# Patient Record
Sex: MALE | Race: WHITE | NOT HISPANIC OR LATINO | URBAN - METROPOLITAN AREA
[De-identification: names, ages, dates, MRNs, and addresses within clinical notes are randomized per-mention and may not be internally consistent; named-entity substitution may affect disease eponyms.]

---

## 2017-02-02 ENCOUNTER — OUTPATIENT (OUTPATIENT)
Dept: OUTPATIENT SERVICES | Facility: HOSPITAL | Age: 37
LOS: 1 days | End: 2017-02-02

## 2017-02-03 ENCOUNTER — MEDICATION RENEWAL (OUTPATIENT)
Age: 37
End: 2017-02-03

## 2017-03-10 ENCOUNTER — LABORATORY RESULT (OUTPATIENT)
Age: 37
End: 2017-03-10

## 2017-03-15 ENCOUNTER — APPOINTMENT (OUTPATIENT)
Age: 37
End: 2017-03-15

## 2017-03-15 VITALS
HEIGHT: 74 IN | DIASTOLIC BLOOD PRESSURE: 85 MMHG | HEART RATE: 60 BPM | SYSTOLIC BLOOD PRESSURE: 125 MMHG | RESPIRATION RATE: 17 BRPM | BODY MASS INDEX: 35.29 KG/M2 | WEIGHT: 275 LBS | TEMPERATURE: 98 F

## 2017-07-19 ENCOUNTER — RX RENEWAL (OUTPATIENT)
Age: 37
End: 2017-07-19

## 2017-09-01 ENCOUNTER — RX RENEWAL (OUTPATIENT)
Age: 37
End: 2017-09-01

## 2017-09-08 LAB
ALBUMIN SERPL ELPH-MCNC: 4.2 G/DL
ALP BLD-CCNC: 155 U/L
ALT SERPL-CCNC: 35 U/L
ANION GAP SERPL CALC-SCNC: 14 MMOL/L
AST SERPL-CCNC: 35 U/L
BASOPHILS # BLD AUTO: 0.03 K/UL
BASOPHILS NFR BLD AUTO: 0.5 %
BILIRUB SERPL-MCNC: 0.7 MG/DL
BUN SERPL-MCNC: 10 MG/DL
CALCIUM SERPL-MCNC: 9.7 MG/DL
CHLORIDE SERPL-SCNC: 103 MMOL/L
CO2 SERPL-SCNC: 25 MMOL/L
CREAT SERPL-MCNC: 1.06 MG/DL
EOSINOPHIL # BLD AUTO: 0.1 K/UL
EOSINOPHIL NFR BLD AUTO: 1.8 %
HCT VFR BLD CALC: 43.9 %
HGB BLD-MCNC: 14.7 G/DL
IMM GRANULOCYTES NFR BLD AUTO: 0 %
LYMPHOCYTES # BLD AUTO: 1.58 K/UL
LYMPHOCYTES NFR BLD AUTO: 27.9 %
MAN DIFF?: NORMAL
MCHC RBC-ENTMCNC: 31.3 PG
MCHC RBC-ENTMCNC: 33.5 GM/DL
MCV RBC AUTO: 93.6 FL
MONOCYTES # BLD AUTO: 0.59 K/UL
MONOCYTES NFR BLD AUTO: 10.4 %
NEUTROPHILS # BLD AUTO: 3.37 K/UL
NEUTROPHILS NFR BLD AUTO: 59.4 %
PLATELET # BLD AUTO: 218 K/UL
POTASSIUM SERPL-SCNC: 4.8 MMOL/L
PROT SERPL-MCNC: 7.4 G/DL
RBC # BLD: 4.69 M/UL
RBC # FLD: 12.7 %
SODIUM SERPL-SCNC: 142 MMOL/L
WBC # FLD AUTO: 5.67 K/UL

## 2017-09-13 ENCOUNTER — APPOINTMENT (OUTPATIENT)
Age: 37
End: 2017-09-13
Payer: COMMERCIAL

## 2017-09-13 VITALS
SYSTOLIC BLOOD PRESSURE: 124 MMHG | HEIGHT: 74 IN | DIASTOLIC BLOOD PRESSURE: 85 MMHG | TEMPERATURE: 98.5 F | BODY MASS INDEX: 36.32 KG/M2 | HEART RATE: 61 BPM | RESPIRATION RATE: 17 BRPM | WEIGHT: 283 LBS

## 2017-09-13 PROCEDURE — 99213 OFFICE O/P EST LOW 20 MIN: CPT | Mod: 25

## 2017-09-13 PROCEDURE — 91200 LIVER ELASTOGRAPHY: CPT

## 2017-09-13 PROCEDURE — ZZZZZ: CPT

## 2017-10-04 ENCOUNTER — RX RENEWAL (OUTPATIENT)
Age: 37
End: 2017-10-04

## 2018-03-08 ENCOUNTER — LABORATORY RESULT (OUTPATIENT)
Age: 38
End: 2018-03-08

## 2018-03-14 ENCOUNTER — APPOINTMENT (OUTPATIENT)
Age: 38
End: 2018-03-14
Payer: COMMERCIAL

## 2018-03-14 VITALS
RESPIRATION RATE: 17 BRPM | WEIGHT: 294 LBS | SYSTOLIC BLOOD PRESSURE: 137 MMHG | TEMPERATURE: 98.5 F | HEART RATE: 75 BPM | DIASTOLIC BLOOD PRESSURE: 88 MMHG | HEIGHT: 74 IN | BODY MASS INDEX: 37.73 KG/M2

## 2018-03-14 PROCEDURE — 99213 OFFICE O/P EST LOW 20 MIN: CPT

## 2018-03-28 ENCOUNTER — OTHER (OUTPATIENT)
Age: 38
End: 2018-03-28

## 2018-03-28 ENCOUNTER — TRANSCRIPTION ENCOUNTER (OUTPATIENT)
Age: 38
End: 2018-03-28

## 2018-03-28 ENCOUNTER — MEDICATION RENEWAL (OUTPATIENT)
Age: 38
End: 2018-03-28

## 2018-04-27 ENCOUNTER — TRANSCRIPTION ENCOUNTER (OUTPATIENT)
Age: 38
End: 2018-04-27

## 2018-07-23 LAB
ALBUMIN SERPL ELPH-MCNC: 4.5 G/DL
ALP BLD-CCNC: 353 U/L
ALT SERPL-CCNC: 86 U/L
ANION GAP SERPL CALC-SCNC: 12 MMOL/L
AST SERPL-CCNC: 79 U/L
BASOPHILS # BLD AUTO: 0.03 K/UL
BASOPHILS NFR BLD AUTO: 0.6 %
BILIRUB SERPL-MCNC: 0.8 MG/DL
BUN SERPL-MCNC: 16 MG/DL
CALCIUM SERPL-MCNC: 9.7 MG/DL
CHLORIDE SERPL-SCNC: 102 MMOL/L
CO2 SERPL-SCNC: 26 MMOL/L
CREAT SERPL-MCNC: 1.17 MG/DL
EOSINOPHIL # BLD AUTO: 0.1 K/UL
EOSINOPHIL NFR BLD AUTO: 1.9 %
HCT VFR BLD CALC: 44 %
HGB BLD-MCNC: 15 G/DL
IMM GRANULOCYTES NFR BLD AUTO: 0.2 %
LYMPHOCYTES # BLD AUTO: 1.37 K/UL
LYMPHOCYTES NFR BLD AUTO: 26.7 %
MAN DIFF?: NORMAL
MCHC RBC-ENTMCNC: 33 PG
MCHC RBC-ENTMCNC: 34.1 GM/DL
MCV RBC AUTO: 96.7 FL
MONOCYTES # BLD AUTO: 0.4 K/UL
MONOCYTES NFR BLD AUTO: 7.8 %
NEUTROPHILS # BLD AUTO: 3.23 K/UL
NEUTROPHILS NFR BLD AUTO: 62.8 %
PLATELET # BLD AUTO: 251 K/UL
POTASSIUM SERPL-SCNC: 4.6 MMOL/L
PROT SERPL-MCNC: 7.8 G/DL
RBC # BLD: 4.55 M/UL
RBC # FLD: 13.3 %
SODIUM SERPL-SCNC: 141 MMOL/L
WBC # FLD AUTO: 5.14 K/UL

## 2018-07-26 ENCOUNTER — APPOINTMENT (OUTPATIENT)
Dept: HEPATOLOGY | Facility: CLINIC | Age: 38
End: 2018-07-26
Payer: COMMERCIAL

## 2018-07-26 VITALS
HEIGHT: 74 IN | SYSTOLIC BLOOD PRESSURE: 128 MMHG | BODY MASS INDEX: 35.42 KG/M2 | RESPIRATION RATE: 14 BRPM | DIASTOLIC BLOOD PRESSURE: 91 MMHG | HEART RATE: 137 BPM | WEIGHT: 276 LBS | OXYGEN SATURATION: 95 % | TEMPERATURE: 97.7 F

## 2018-07-26 LAB
AFP-TM SERPL-MCNC: 3.4 NG/ML
INR PPP: 1.02 RATIO
PT BLD: 11.5 SEC

## 2018-07-26 PROCEDURE — 99213 OFFICE O/P EST LOW 20 MIN: CPT

## 2018-08-01 LAB
ALBUMIN SERPL ELPH-MCNC: 4.4 G/DL
ALP BLD-CCNC: 386 U/L
ALT SERPL-CCNC: 97 U/L
ANA PAT FLD IF-IMP: ABNORMAL
ANA PATTERN: ABNORMAL
ANA SER IF-ACNC: ABNORMAL
ANA TITER: ABNORMAL
ANION GAP SERPL CALC-SCNC: 14 MMOL/L
AST SERPL-CCNC: 93 U/L
BILIRUB SERPL-MCNC: 0.7 MG/DL
BUN SERPL-MCNC: 14 MG/DL
CALCIUM SERPL-MCNC: 9.8 MG/DL
CHLORIDE SERPL-SCNC: 100 MMOL/L
CO2 SERPL-SCNC: 25 MMOL/L
CREAT SERPL-MCNC: 0.93 MG/DL
EBV DNA SERPL NAA+PROBE-ACNC: NOT DETECTED IU/ML
EBV EA AB SER IA-ACNC: >150 U/ML
EBV EA AB TITR SER IF: POSITIVE
EBV EA IGG SER QL IA: 454 U/ML
EBV EA IGG SER-ACNC: POSITIVE
EBV EA IGM SER IA-ACNC: NEGATIVE
EBV PATRN SPEC IB-IMP: NORMAL
EBV VCA IGG SER IA-ACNC: >750 U/ML
EBV VCA IGM SER QL IA: <10 U/ML
EBVPCR LOG: NOT DETECTED LOGIU/ML
EPSTEIN-BARR VIRUS CAPSID ANTIGEN IGG: POSITIVE
GGT SERPL-CCNC: 450 U/L
HBV CORE IGG+IGM SER QL: NONREACTIVE
HBV SURFACE AB SER QL: REACTIVE
HBV SURFACE AB SERPL IA-ACNC: 121.2 MIU/ML
HBV SURFACE AG SER QL: NONREACTIVE
HEPATITIS A IGG ANTIBODY: NONREACTIVE
HEPATITIS E IGM ABY: NORMAL
HEV AB SER QL: NEGATIVE
IGA SER QL IEP: 221 MG/DL
IGG SER QL IEP: 1969 MG/DL
IGM SER QL IEP: 117 MG/DL
MITOCHONDRIA AB SER IF-ACNC: NORMAL
POTASSIUM SERPL-SCNC: 4.7 MMOL/L
PROT SERPL-MCNC: 8.7 G/DL
SMOOTH MUSCLE AB SER QL IF: ABNORMAL
SODIUM SERPL-SCNC: 139 MMOL/L

## 2018-08-06 ENCOUNTER — MEDICATION RENEWAL (OUTPATIENT)
Age: 38
End: 2018-08-06

## 2018-08-06 ENCOUNTER — TRANSCRIPTION ENCOUNTER (OUTPATIENT)
Age: 38
End: 2018-08-06

## 2018-08-12 ENCOUNTER — FORM ENCOUNTER (OUTPATIENT)
Age: 38
End: 2018-08-12

## 2018-08-13 ENCOUNTER — OUTPATIENT (OUTPATIENT)
Dept: OUTPATIENT SERVICES | Facility: HOSPITAL | Age: 38
LOS: 1 days | End: 2018-08-13
Payer: COMMERCIAL

## 2018-08-13 ENCOUNTER — APPOINTMENT (OUTPATIENT)
Dept: MRI IMAGING | Facility: CLINIC | Age: 38
End: 2018-08-13
Payer: COMMERCIAL

## 2018-08-13 DIAGNOSIS — Z00.8 ENCOUNTER FOR OTHER GENERAL EXAMINATION: ICD-10-CM

## 2018-08-13 PROCEDURE — 74183 MRI ABD W/O CNTR FLWD CNTR: CPT | Mod: 26

## 2018-08-13 PROCEDURE — A9585: CPT

## 2018-08-13 PROCEDURE — 74183 MRI ABD W/O CNTR FLWD CNTR: CPT

## 2018-08-23 ENCOUNTER — LABORATORY RESULT (OUTPATIENT)
Age: 38
End: 2018-08-23

## 2018-08-30 ENCOUNTER — TRANSCRIPTION ENCOUNTER (OUTPATIENT)
Age: 38
End: 2018-08-30

## 2018-09-03 ENCOUNTER — OTHER (OUTPATIENT)
Age: 38
End: 2018-09-03

## 2018-09-03 ENCOUNTER — MOBILE ON CALL (OUTPATIENT)
Age: 38
End: 2018-09-03

## 2018-09-12 ENCOUNTER — RX RENEWAL (OUTPATIENT)
Age: 38
End: 2018-09-12

## 2018-09-24 ENCOUNTER — TRANSCRIPTION ENCOUNTER (OUTPATIENT)
Age: 38
End: 2018-09-24

## 2018-10-01 ENCOUNTER — RX RENEWAL (OUTPATIENT)
Age: 38
End: 2018-10-01

## 2018-10-12 ENCOUNTER — RX RENEWAL (OUTPATIENT)
Age: 38
End: 2018-10-12

## 2018-11-20 ENCOUNTER — TRANSCRIPTION ENCOUNTER (OUTPATIENT)
Age: 38
End: 2018-11-20

## 2018-11-23 ENCOUNTER — APPOINTMENT (OUTPATIENT)
Dept: ULTRASOUND IMAGING | Facility: CLINIC | Age: 38
End: 2018-11-23

## 2018-11-28 ENCOUNTER — TRANSCRIPTION ENCOUNTER (OUTPATIENT)
Age: 38
End: 2018-11-28

## 2018-11-28 ENCOUNTER — OTHER (OUTPATIENT)
Age: 38
End: 2018-11-28

## 2018-11-29 ENCOUNTER — TRANSCRIPTION ENCOUNTER (OUTPATIENT)
Age: 38
End: 2018-11-29

## 2019-01-11 ENCOUNTER — OTHER (OUTPATIENT)
Age: 39
End: 2019-01-11

## 2019-01-11 ENCOUNTER — TRANSCRIPTION ENCOUNTER (OUTPATIENT)
Age: 39
End: 2019-01-11

## 2019-02-07 ENCOUNTER — LABORATORY RESULT (OUTPATIENT)
Age: 39
End: 2019-02-07

## 2019-02-08 ENCOUNTER — APPOINTMENT (OUTPATIENT)
Dept: HEPATOLOGY | Facility: CLINIC | Age: 39
End: 2019-02-08
Payer: COMMERCIAL

## 2019-02-08 VITALS
TEMPERATURE: 98.1 F | WEIGHT: 282 LBS | BODY MASS INDEX: 36.19 KG/M2 | HEIGHT: 74 IN | SYSTOLIC BLOOD PRESSURE: 120 MMHG | DIASTOLIC BLOOD PRESSURE: 84 MMHG | HEART RATE: 73 BPM | RESPIRATION RATE: 16 BRPM

## 2019-02-08 LAB
ALBUMIN SERPL ELPH-MCNC: 4.2 G/DL
ALP BLD-CCNC: 255 U/L
ALT SERPL-CCNC: 264 U/L
ANION GAP SERPL CALC-SCNC: 11 MMOL/L
AST SERPL-CCNC: 159 U/L
BASOPHILS # BLD AUTO: 0.02 K/UL
BASOPHILS NFR BLD AUTO: 0.4 %
BILIRUB SERPL-MCNC: 1.2 MG/DL
BUN SERPL-MCNC: 13 MG/DL
CALCIUM SERPL-MCNC: 9.4 MG/DL
CARDIOLIPIN AB SER IA-ACNC: NEGATIVE
CHLORIDE SERPL-SCNC: 105 MMOL/L
CO2 SERPL-SCNC: 25 MMOL/L
CREAT SERPL-MCNC: 1.02 MG/DL
EOSINOPHIL # BLD AUTO: 0.11 K/UL
EOSINOPHIL NFR BLD AUTO: 2.4 %
HCT VFR BLD CALC: 44.7 %
HGB BLD-MCNC: 14.6 G/DL
IGA SER QL IEP: 226 MG/DL
IGG SER QL IEP: 2241 MG/DL
IGM SER QL IEP: 129 MG/DL
IMM GRANULOCYTES NFR BLD AUTO: 0.2 %
LYMPHOCYTES # BLD AUTO: 1.37 K/UL
LYMPHOCYTES NFR BLD AUTO: 29.7 %
MAN DIFF?: NORMAL
MCHC RBC-ENTMCNC: 32.1 PG
MCHC RBC-ENTMCNC: 32.7 GM/DL
MCV RBC AUTO: 98.2 FL
MONOCYTES # BLD AUTO: 0.52 K/UL
MONOCYTES NFR BLD AUTO: 11.3 %
MPO AB + PR3 PNL SER: NORMAL
NEUTROPHILS # BLD AUTO: 2.59 K/UL
NEUTROPHILS NFR BLD AUTO: 56 %
PLATELET # BLD AUTO: 213 K/UL
POTASSIUM SERPL-SCNC: 4.6 MMOL/L
PROT SERPL-MCNC: 7.9 G/DL
RBC # BLD: 4.55 M/UL
RBC # FLD: 13.2 %
SODIUM SERPL-SCNC: 141 MMOL/L
WBC # FLD AUTO: 4.62 K/UL

## 2019-02-08 PROCEDURE — 99213 OFFICE O/P EST LOW 20 MIN: CPT

## 2019-02-08 RX ORDER — METOPROLOL SUCCINATE 50 MG/1
50 TABLET, EXTENDED RELEASE ORAL
Qty: 90 | Refills: 0 | Status: DISCONTINUED | COMMUNITY
Start: 2018-07-16 | End: 2019-02-08

## 2019-02-08 NOTE — LETTER CLOSING
[Edgar Manzano MD, FACP, FACG, AGAF] : Egdar Manzano MD, FACP, FACG, AGAF [Chief, Division of Hepatology] : Chief, Division of Hepatology [[facility]] : [facility] [Professor of Medicine] : Professor of Medicine [[facility]] : [facility]

## 2019-02-08 NOTE — CONSULT LETTER
[Dear  ___] : Dear  [unfilled], [Courtesy Letter:] : I had the pleasure of seeing your patient, [unfilled], in my office today. [Please see my note below.] : Please see my note below. [Consult Closing:] : Thank you very much for allowing me to participate in the care of this patient.  If you have any questions, please do not hesitate to contact me. [Sincerely,] : Sincerely, [Ying Coburn, N.P] : Ying Coburn, N.P [FreeTextEntry2] : Dr Pete Yarbrough

## 2019-02-08 NOTE — PHYSICAL EXAM
[Liver Size (___ Cm)] : Liver size [unfilled] cm [Non-Tender] : non-tender [General Appearance - Alert] : alert [General Appearance - In No Acute Distress] : in no acute distress [Sclera] : the sclera and conjunctiva were normal [Neck Appearance] : the appearance of the neck was normal [Neck Cervical Mass (___cm)] : no neck mass was observed [Jugular Venous Distention Increased] : there was no jugular-venous distention [Thyroid Diffuse Enlargement] : the thyroid was not enlarged [Thyroid Nodule] : there were no palpable thyroid nodules [Auscultation Breath Sounds / Voice Sounds] : lungs were clear to auscultation bilaterally [Heart Rate And Rhythm] : heart rate was normal and rhythm regular [Heart Sounds] : normal S1 and S2 [Heart Sounds Gallop] : no gallops [Murmurs] : no murmurs [Heart Sounds Pericardial Friction Rub] : no pericardial rub [Edema] : there was no peripheral edema [Bowel Sounds] : normal bowel sounds [Abdomen Soft] : soft [Abdomen Tenderness] : non-tender [] : no hepato-splenomegaly [Abdomen Mass (___ Cm)] : no abdominal mass palpated [Oriented To Time, Place, And Person] : oriented to person, place, and time [Scleral Icterus] : No Scleral Icterus [Hepatojugular Reflux] : patient did not have a sustained hepatojugular reflux [Spider Angioma] : No spider angioma(s) were observed [Abdominal Bruit] : no abdominal bruit [Splenomegaly] : no splenomegaly [Asterixis] : no asterixis observed [Jaundice] : No jaundice [Palmar Erythema] : no Palmar Erythema

## 2019-02-08 NOTE — HISTORY OF PRESENT ILLNESS
[de-identified] : Melchor  comes in today for followup. He has abnormal liver enzymes likely secondary to autoimmune liver disease. He feels well without complaints. He has been dieting but not been successful in losing weight.   He is currently taking prednisone 2 mg a day (since Jan 2016),  Rupert 2 g a day and Imuran 75 mg a day. Liver biopsy from February 4, 2014 shows chronic hepatitis and portal fibrosis no evidence of bridging fibrosis or cirrhosis. It was nondiagnostic for autoimmune disease and there was some biliary ductal abnormalities. He reports being told that he have fatty liver on imaging in the past. His blood tests on 2/7/19 showed a rise in liver markers, ,  and , IGG 2241. He has not started new supplements/herbal products or medications. No alcohol use. No recent illness. His complete liver workup from July 2018  was negative for viral hepatitis and PBC, ASMA was 1:40, CHALRIE 1:160 and IGG 1881.  MRI from Aug 2018 did not showed evidence of PSC. \par \par Fibroscan test done 4/5/16 showed F4. \par Fibroscan test performed 9/13/17 showed F0, steatosis S0.\par MRE from 8/13/18 showed no fibrosis, no significant steatosis. \par \par Abdo MRI 8/13/18 showed no hepatic masses, bile ducts without choledocholithiasis or stricture, elastrography showed no fibrosis. \par \par US from 7/21/18 no hepatic lesion, common bile duct and pancreas not well visualized due to excessive overlying bowel gas.\par \par Abdominal ultrasound 3/8/17 showed no hepatic lesions. \par \par Abdominal sonogram from December 2, 2015 shows diffuse fatty infiltration and splenomegaly.\par \par

## 2019-02-08 NOTE — ASSESSMENT
[FreeTextEntry1] : 38-year-old gentleman with abnormal liver enzymes who is being treated for autoimmune liver disease. 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 He also has fatty liver based on imaging.  His blood tests on 2/7/19 showed a rise in liver markers, ,  and , IGG 2241. He has not started new supplements/herbal products or medications. No alcohol use. No recent illness. I recommended increasing prednisone to 30 mg daily with a rapid taper reducing by 5 mg every 5 days with repeat lab. If no improvement with LFTs than he will need a liver biopsy. He is agreeable. He has asked me to leave him a message on his voicemail if he does not . \par \par  He will continue with diet and exercise.   He will continue with azathioprine and ursodiol same dosage. \par \par I have reviewed the treatment of fatty liver disease with the patient. I have explained that the best therapy is diet and exercise. I have reviewed and appropriate diet with the patient. I have recommended the avoidance of fatty foods and to follow a good healthy heart diet. I have explained that weight loss may lead to an improvement in the underlying liver disease state. \par \par I have reviewed the side effects of prednisone with the patient. I have reviewed the risks of long term treatment. I have explained the risks of developing acne, moon-shaped facies, abdominal striae, a dorsal hump, weight gain, diabetes, cataracts, hypertension, and ischemic necrosis of the hips. I have explained that there may be other side effects as well related to prednisone treatment. I had reviewed with the patient the side effects of azathioprine. I have reviewed the risks of nausea, vomiting, abdominal discomfort, hepatotoxicity, rash, leukocytopenia. I have also reviewed the theoretical risks of development of malignancy.\par \par I reviewed with the patient the side effects of azathioprine. I have reviewed the risks of nausea, vomiting, abdominal discomfort, hepatotoxicity, rash, leukocytopenia. I have also reviewed the theoretical risks of development of malignancy.\par \par

## 2019-02-11 LAB — IGG4 SER-MCNC: 94 MG/DL

## 2019-02-19 ENCOUNTER — TRANSCRIPTION ENCOUNTER (OUTPATIENT)
Age: 39
End: 2019-02-19

## 2019-02-19 ENCOUNTER — OTHER (OUTPATIENT)
Age: 39
End: 2019-02-19

## 2019-02-20 LAB
ALBUMIN SERPL ELPH-MCNC: 3.6 G/DL
ALP BLD-CCNC: 164 U/L
ALT SERPL-CCNC: 70 U/L
ANION GAP SERPL CALC-SCNC: 12 MMOL/L
AST SERPL-CCNC: 34 U/L
BILIRUB SERPL-MCNC: 0.6 MG/DL
BUN SERPL-MCNC: 16 MG/DL
CALCIUM SERPL-MCNC: 8.9 MG/DL
CHLORIDE SERPL-SCNC: 106 MMOL/L
CO2 SERPL-SCNC: 24 MMOL/L
CREAT SERPL-MCNC: 1.05 MG/DL
POTASSIUM SERPL-SCNC: 3.9 MMOL/L
PROT SERPL-MCNC: 7 G/DL
SODIUM SERPL-SCNC: 142 MMOL/L

## 2019-02-26 ENCOUNTER — MEDICATION RENEWAL (OUTPATIENT)
Age: 39
End: 2019-02-26

## 2019-03-02 ENCOUNTER — TRANSCRIPTION ENCOUNTER (OUTPATIENT)
Age: 39
End: 2019-03-02

## 2019-03-04 LAB
ALBUMIN SERPL ELPH-MCNC: 4 G/DL
ALP BLD-CCNC: 142 U/L
ALT SERPL-CCNC: 43 U/L
ANION GAP SERPL CALC-SCNC: 11 MMOL/L
AST SERPL-CCNC: 27 U/L
BILIRUB SERPL-MCNC: 0.7 MG/DL
BUN SERPL-MCNC: 18 MG/DL
CALCIUM SERPL-MCNC: 9.1 MG/DL
CHLORIDE SERPL-SCNC: 104 MMOL/L
CO2 SERPL-SCNC: 27 MMOL/L
CREAT SERPL-MCNC: 1.1 MG/DL
POTASSIUM SERPL-SCNC: 3.9 MMOL/L
PROT SERPL-MCNC: 7 G/DL
SODIUM SERPL-SCNC: 142 MMOL/L

## 2019-03-08 ENCOUNTER — TRANSCRIPTION ENCOUNTER (OUTPATIENT)
Age: 39
End: 2019-03-08

## 2019-03-11 ENCOUNTER — APPOINTMENT (OUTPATIENT)
Dept: HEPATOLOGY | Facility: CLINIC | Age: 39
End: 2019-03-11
Payer: COMMERCIAL

## 2019-03-11 VITALS
RESPIRATION RATE: 16 BRPM | TEMPERATURE: 98.2 F | BODY MASS INDEX: 38.12 KG/M2 | WEIGHT: 297 LBS | DIASTOLIC BLOOD PRESSURE: 84 MMHG | HEART RATE: 74 BPM | SYSTOLIC BLOOD PRESSURE: 124 MMHG | HEIGHT: 74 IN

## 2019-03-11 PROCEDURE — 99213 OFFICE O/P EST LOW 20 MIN: CPT

## 2019-03-12 LAB
ALBUMIN SERPL ELPH-MCNC: 4.2 G/DL
ALP BLD-CCNC: 165 U/L
ALT SERPL-CCNC: 24 U/L
ANION GAP SERPL CALC-SCNC: 10 MMOL/L
AST SERPL-CCNC: 13 U/L
BILIRUB SERPL-MCNC: 0.4 MG/DL
BUN SERPL-MCNC: 12 MG/DL
CALCIUM SERPL-MCNC: 9.1 MG/DL
CHLORIDE SERPL-SCNC: 107 MMOL/L
CO2 SERPL-SCNC: 22 MMOL/L
CREAT SERPL-MCNC: 0.95 MG/DL
DEPRECATED KAPPA LC FREE/LAMBDA SER: 1.32 RATIO
IGA SER QL IEP: 194 MG/DL
IGG SER QL IEP: 1334 MG/DL
IGM SER QL IEP: 110 MG/DL
KAPPA LC CSF-MCNC: 1.35 MG/DL
KAPPA LC SERPL-MCNC: 1.78 MG/DL
POTASSIUM SERPL-SCNC: 5.1 MMOL/L
PROT SERPL-MCNC: 7.5 G/DL
SODIUM SERPL-SCNC: 139 MMOL/L

## 2019-04-03 LAB
ALBUMIN SERPL ELPH-MCNC: 4.4 G/DL
ALP BLD-CCNC: 149 U/L
ALT SERPL-CCNC: 31 U/L
ANION GAP SERPL CALC-SCNC: 11 MMOL/L
AST SERPL-CCNC: 37 U/L
BILIRUB SERPL-MCNC: 0.5 MG/DL
BUN SERPL-MCNC: 15 MG/DL
CALCIUM SERPL-MCNC: 9.8 MG/DL
CHLORIDE SERPL-SCNC: 104 MMOL/L
CO2 SERPL-SCNC: 25 MMOL/L
CREAT SERPL-MCNC: 1.04 MG/DL
POTASSIUM SERPL-SCNC: 4.8 MMOL/L
PROT SERPL-MCNC: 7.3 G/DL
SODIUM SERPL-SCNC: 140 MMOL/L

## 2019-06-06 LAB
ALBUMIN SERPL ELPH-MCNC: 4.2 G/DL
ALP BLD-CCNC: 198 U/L
ALT SERPL-CCNC: 52 U/L
ANION GAP SERPL CALC-SCNC: 12 MMOL/L
AST SERPL-CCNC: 50 U/L
BASOPHILS # BLD AUTO: 0.03 K/UL
BASOPHILS NFR BLD AUTO: 0.7 %
BILIRUB SERPL-MCNC: 0.7 MG/DL
BUN SERPL-MCNC: 11 MG/DL
CALCIUM SERPL-MCNC: 9.3 MG/DL
CHLORIDE SERPL-SCNC: 106 MMOL/L
CO2 SERPL-SCNC: 24 MMOL/L
CREAT SERPL-MCNC: 1.03 MG/DL
EOSINOPHIL # BLD AUTO: 0.15 K/UL
EOSINOPHIL NFR BLD AUTO: 3.4 %
HCT VFR BLD CALC: 44.6 %
HGB BLD-MCNC: 14.4 G/DL
IMM GRANULOCYTES NFR BLD AUTO: 0.2 %
LYMPHOCYTES # BLD AUTO: 1.24 K/UL
LYMPHOCYTES NFR BLD AUTO: 27.9 %
MAN DIFF?: NORMAL
MCHC RBC-ENTMCNC: 31.1 PG
MCHC RBC-ENTMCNC: 32.3 GM/DL
MCV RBC AUTO: 96.3 FL
MONOCYTES # BLD AUTO: 0.45 K/UL
MONOCYTES NFR BLD AUTO: 10.1 %
NEUTROPHILS # BLD AUTO: 2.56 K/UL
NEUTROPHILS NFR BLD AUTO: 57.7 %
PLATELET # BLD AUTO: 193 K/UL
POTASSIUM SERPL-SCNC: 3.9 MMOL/L
PROT SERPL-MCNC: 7.2 G/DL
RBC # BLD: 4.63 M/UL
RBC # FLD: 13.2 %
SODIUM SERPL-SCNC: 141 MMOL/L
WBC # FLD AUTO: 4.44 K/UL

## 2019-06-07 ENCOUNTER — APPOINTMENT (OUTPATIENT)
Dept: HEPATOLOGY | Facility: CLINIC | Age: 39
End: 2019-06-07
Payer: COMMERCIAL

## 2019-06-07 VITALS
WEIGHT: 297 LBS | DIASTOLIC BLOOD PRESSURE: 91 MMHG | TEMPERATURE: 98.3 F | RESPIRATION RATE: 16 BRPM | SYSTOLIC BLOOD PRESSURE: 123 MMHG | HEIGHT: 74 IN | BODY MASS INDEX: 38.12 KG/M2 | HEART RATE: 74 BPM

## 2019-06-07 LAB
DEPRECATED KAPPA LC FREE/LAMBDA SER: 1.68 RATIO
IGA SER QL IEP: 194 MG/DL
IGG SER QL IEP: 1502 MG/DL
IGM SER QL IEP: 101 MG/DL
KAPPA LC CSF-MCNC: 1.49 MG/DL
KAPPA LC SERPL-MCNC: 2.5 MG/DL

## 2019-06-07 PROCEDURE — 99214 OFFICE O/P EST MOD 30 MIN: CPT

## 2019-06-07 NOTE — PHYSICAL EXAM
[Scleral Icterus] : No Scleral Icterus [Hepatojugular Reflux] : patient did not have a sustained hepatojugular reflux [Spider Angioma] : No spider angioma(s) were observed [Abdominal Bruit] : no abdominal bruit [Splenomegaly] : no splenomegaly [Liver Size (___ Cm)] : Liver size [unfilled] cm [Non-Tender] : non-tender [Asterixis] : no asterixis observed [Jaundice] : No jaundice [Palmar Erythema] : no Palmar Erythema [General Appearance - Alert] : alert [General Appearance - In No Acute Distress] : in no acute distress [Sclera] : the sclera and conjunctiva were normal [Auscultation Breath Sounds / Voice Sounds] : lungs were clear to auscultation bilaterally [Heart Rate And Rhythm] : heart rate was normal and rhythm regular [Heart Sounds] : normal S1 and S2 [Heart Sounds Gallop] : no gallops [Murmurs] : no murmurs [Heart Sounds Pericardial Friction Rub] : no pericardial rub [Edema] : there was no peripheral edema [Bowel Sounds] : normal bowel sounds [Abdomen Soft] : soft [Abdomen Tenderness] : non-tender [] : no hepato-splenomegaly [Abdomen Mass (___ Cm)] : no abdominal mass palpated [Oriented To Time, Place, And Person] : oriented to person, place, and time

## 2019-06-07 NOTE — HISTORY OF PRESENT ILLNESS
[de-identified] : Melchor  comes in today for followup. He has abnormal liver enzymes likely secondary to autoimmune liver disease. He feels well without complaints. His blood tests on 2/7/19 showed a rise in liver markers, ,  and , IGG 2241. He was started on prednisone 30 mg daily with a rapid taper reducing by 5 mg every 5 days  and completed in April. He is currently taking prednisone 2 mg a day Rupert 2 g a day and Imuran 75 mg a day. His blood tests at the end of taper in April showed normal ALT and AST with significant reduction of ALP but now rising ALT 52, AST 50, . \par \par Liver biopsy from February 4, 2014 shows chronic hepatitis and portal fibrosis no evidence of bridging fibrosis or cirrhosis. It was nondiagnostic for autoimmune disease and there was some biliary ductal abnormalities. He reports being told that he have fatty liver on imaging in the past. \par \par Blood tests on 2/7/19 showed a rise in liver markers, ,  and , IGG 2241. He has not started new supplements/herbal products or medications. No alcohol use. No recent illness. His complete liver workup from July 2018  was negative for viral hepatitis and PBC, ASMA was 1:40, CHARLIE 1:160 and IGG 1881.  MRI from Aug 2018 did not showed evidence of PSC. \par \par Fibroscan test done 4/5/16 showed F4. \par Fibroscan test performed 9/13/17 showed F0, steatosis S0.\par MRE from 8/13/18 showed stage 1 to 2 fibrosis, no significant steatosis. \par \par Abdo MRI 8/13/18 showed no hepatic masses, bile ducts without choledocholithiasis or stricture, elastrography showed no fibrosis. \par \par US from 7/21/18 no hepatic lesion, common bile duct and pancreas not well visualized due to excessive overlying bowel gas.\par \par Abdominal ultrasound 3/8/17 showed no hepatic lesions. \par \par Abdominal sonogram from December 2, 2015 shows diffuse fatty infiltration and splenomegaly.\par \par

## 2019-06-07 NOTE — CONSULT LETTER
[Courtesy Letter:] : I had the pleasure of seeing your patient, [unfilled], in my office today. [Dear  ___] : Dear  [unfilled], [Please see my note below.] : Please see my note below. [Consult Closing:] : Thank you very much for allowing me to participate in the care of this patient.  If you have any questions, please do not hesitate to contact me. [Sincerely,] : Sincerely, [FreeTextEntry2] : Dr Pete Yarbrough [Ying Coburn, N.P] : Ying Coburn, N.P

## 2019-06-17 LAB
TPMT COMMENT: NORMAL
TPMT GENE MUT ANL BLD/T: NORMAL
TPMT GENETICS TEST: NORMAL
TPMT INTERPRETATION: NORMAL

## 2019-08-27 ENCOUNTER — FORM ENCOUNTER (OUTPATIENT)
Age: 39
End: 2019-08-27

## 2019-08-28 ENCOUNTER — APPOINTMENT (OUTPATIENT)
Dept: MRI IMAGING | Facility: IMAGING CENTER | Age: 39
End: 2019-08-28

## 2019-08-28 ENCOUNTER — OUTPATIENT (OUTPATIENT)
Dept: OUTPATIENT SERVICES | Facility: HOSPITAL | Age: 39
LOS: 1 days | End: 2019-08-28
Payer: COMMERCIAL

## 2019-08-28 DIAGNOSIS — K76.0 FATTY (CHANGE OF) LIVER, NOT ELSEWHERE CLASSIFIED: ICD-10-CM

## 2019-08-28 PROCEDURE — A9585: CPT

## 2019-08-28 PROCEDURE — 76391 MR ELASTOGRAPHY: CPT | Mod: 26

## 2019-08-28 PROCEDURE — 74183 MRI ABD W/O CNTR FLWD CNTR: CPT | Mod: 26

## 2019-08-28 PROCEDURE — 76391 MR ELASTOGRAPHY: CPT

## 2019-08-28 PROCEDURE — 74183 MRI ABD W/O CNTR FLWD CNTR: CPT

## 2019-09-17 ENCOUNTER — TRANSCRIPTION ENCOUNTER (OUTPATIENT)
Age: 39
End: 2019-09-17

## 2019-09-19 LAB
ALBUMIN SERPL ELPH-MCNC: 4.4 G/DL
ALP BLD-CCNC: 184 U/L
ALT SERPL-CCNC: 63 U/L
ANION GAP SERPL CALC-SCNC: 12 MMOL/L
AST SERPL-CCNC: 56 U/L
BASOPHILS # BLD AUTO: 0.05 K/UL
BASOPHILS NFR BLD AUTO: 0.9 %
BILIRUB SERPL-MCNC: 0.4 MG/DL
BUN SERPL-MCNC: 14 MG/DL
CALCIUM SERPL-MCNC: 9.5 MG/DL
CHLORIDE SERPL-SCNC: 102 MMOL/L
CO2 SERPL-SCNC: 26 MMOL/L
CREAT SERPL-MCNC: 1.04 MG/DL
DEPRECATED KAPPA LC FREE/LAMBDA SER: 1.53 RATIO
EOSINOPHIL # BLD AUTO: 0.11 K/UL
EOSINOPHIL NFR BLD AUTO: 1.9 %
HCT VFR BLD CALC: 44.9 %
HGB BLD-MCNC: 14.8 G/DL
IGA SER QL IEP: 235 MG/DL
IGG SER QL IEP: 1922 MG/DL
IGM SER QL IEP: 120 MG/DL
IMM GRANULOCYTES NFR BLD AUTO: 0.2 %
KAPPA LC CSF-MCNC: 1.71 MG/DL
KAPPA LC SERPL-MCNC: 2.61 MG/DL
LYMPHOCYTES # BLD AUTO: 1.66 K/UL
LYMPHOCYTES NFR BLD AUTO: 29.1 %
MAN DIFF?: NORMAL
MCHC RBC-ENTMCNC: 31.3 PG
MCHC RBC-ENTMCNC: 33 GM/DL
MCV RBC AUTO: 94.9 FL
MONOCYTES # BLD AUTO: 0.66 K/UL
MONOCYTES NFR BLD AUTO: 11.6 %
NEUTROPHILS # BLD AUTO: 3.21 K/UL
NEUTROPHILS NFR BLD AUTO: 56.3 %
PLATELET # BLD AUTO: 233 K/UL
POTASSIUM SERPL-SCNC: 4.3 MMOL/L
PROT SERPL-MCNC: 8.2 G/DL
RBC # BLD: 4.73 M/UL
RBC # FLD: 12.9 %
SODIUM SERPL-SCNC: 140 MMOL/L
WBC # FLD AUTO: 5.7 K/UL

## 2019-09-20 ENCOUNTER — APPOINTMENT (OUTPATIENT)
Dept: HEPATOLOGY | Facility: CLINIC | Age: 39
End: 2019-09-20
Payer: COMMERCIAL

## 2019-09-20 VITALS
RESPIRATION RATE: 16 BRPM | DIASTOLIC BLOOD PRESSURE: 95 MMHG | TEMPERATURE: 98.5 F | BODY MASS INDEX: 38.5 KG/M2 | HEIGHT: 74 IN | HEART RATE: 71 BPM | WEIGHT: 300 LBS | SYSTOLIC BLOOD PRESSURE: 142 MMHG

## 2019-09-20 VITALS — SYSTOLIC BLOOD PRESSURE: 130 MMHG | DIASTOLIC BLOOD PRESSURE: 90 MMHG

## 2019-09-20 PROCEDURE — 99214 OFFICE O/P EST MOD 30 MIN: CPT

## 2019-09-20 NOTE — ASSESSMENT
[FreeTextEntry1] : 38-year-old gentleman with abnormal liver enzymes who is being treated for autoimmune liver disease. 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 He also has fatty liver based on imaging. Azathioprine was increased  to 100 mg daily in June but he did not have further improvement in liver markers however they did not worsen either.  I recommended that he continue with Azathioprine 100 mg daily,  prednisone 2 mg a day and Ursodiol 2 g a day and repeat labs in 3 months. \par \par  He will continue with diet and exercise.   \par \par I have reviewed the treatment of fatty liver disease with the patient. I have explained that the best therapy is diet and exercise. I have reviewed and appropriate diet with the patient. I have recommended the avoidance of fatty foods and to follow a good healthy heart diet. I have explained that weight loss may lead to an improvement in the underlying liver disease state. \par \par I have reviewed the side effects of prednisone with the patient. I have reviewed the risks of long term treatment. I have explained the risks of developing acne, moon-shaped facies, abdominal striae, a dorsal hump, weight gain, diabetes, cataracts, hypertension, and ischemic necrosis of the hips. I have explained that there may be other side effects as well related to prednisone treatment. I had reviewed with the patient the side effects of azathioprine. I have reviewed the risks of nausea, vomiting, abdominal discomfort, hepatotoxicity, rash, leukocytopenia. I have also reviewed the theoretical risks of development of malignancy.\par \par I reviewed with the patient the side effects of azathioprine. I have reviewed the risks of nausea, vomiting, abdominal discomfort, hepatotoxicity, rash, leukocytopenia. I have also reviewed the theoretical risks of development of malignancy.\par \par I will see him in 3 months.

## 2019-09-20 NOTE — CONSULT LETTER
[Dear  ___] : Dear  [unfilled], [Courtesy Letter:] : I had the pleasure of seeing your patient, [unfilled], in my office today. [Please see my note below.] : Please see my note below. [Consult Closing:] : Thank you very much for allowing me to participate in the care of this patient.  If you have any questions, please do not hesitate to contact me. [FreeTextEntry2] : Dr Pete Yarbrough [Sincerely,] : Sincerely, [Ying Coburn, N.P] : Ying Coburn, N.P

## 2019-09-20 NOTE — HISTORY OF PRESENT ILLNESS
[de-identified] : Melchor  comes in today for followup. He has abnormal liver enzymes likely secondary to autoimmune liver disease. He feels well without complaints. His blood tests on 2/7/19 showed a rise in liver markers, ,  and , IGG 2241. He was started on prednisone 30 mg daily with a rapid taper reducing by 5 mg every 5 days  and completed in April. His blood tests at the end of taper in April showed normalization of ALT and AST with significant reduction of ALP but by June blood test showed rising ALT 52, AST 50, . His Azathioprine was increased to 100 mg daily and he was continued with prednisone 2 mg a day and Ursodiol 2 g a day. His blood test from 9/18/19 showed ALT 63, AST 56, . \par \par Liver biopsy from February 4, 2014 shows chronic hepatitis and portal fibrosis no evidence of bridging fibrosis or cirrhosis. It was nondiagnostic for autoimmune disease and there was some biliary ductal abnormalities. MRCP did not showed evidence of PSC. He reports being told that he have fatty liver on imaging in the past. \par \par Blood tests on 2/7/19 showed a rise in liver markers, ,  and , IGG 2241. He has not started new supplements/herbal products or medications. No alcohol use. No recent illness. His complete liver workup from July 2018  was negative for viral hepatitis and PBC, ASMA was 1:40, CHARLIE 1:160 and IGG 1881.  MRI from Aug 2018 did not showed evidence of PSC. \par \par Fibroscan test done 4/5/16 showed F4. \par Fibroscan test performed 9/13/17 showed F0, steatosis S0.\par MRE from 8/13/18 showed stage 1 to 2 fibrosis, no significant steatosis. \par MRE 8/28/19 showed stage 1-2 fibrosis with mild steatosis. \par \par Abdo MRI 8/13/18 showed no hepatic masses, bile ducts without choledocholithiasis or stricture, elastrography showed no fibrosis. \par \par US from 7/21/18 no hepatic lesion, common bile duct and pancreas not well visualized due to excessive overlying bowel gas.\par \par Abdominal ultrasound 3/8/17 showed no hepatic lesions. \par \par Abdominal sonogram from December 2, 2015 shows diffuse fatty infiltration and splenomegaly.\par \par

## 2019-09-20 NOTE — PHYSICAL EXAM
[Hepatojugular Reflux] : patient did not have a sustained hepatojugular reflux [Scleral Icterus] : No Scleral Icterus [Spider Angioma] : No spider angioma(s) were observed [Abdominal Bruit] : no abdominal bruit [Liver Size (___ Cm)] : Liver size [unfilled] cm [Splenomegaly] : no splenomegaly [Non-Tender] : non-tender [Asterixis] : no asterixis observed [Jaundice] : No jaundice [Palmar Erythema] : no Palmar Erythema [General Appearance - Alert] : alert [General Appearance - In No Acute Distress] : in no acute distress [Sclera] : the sclera and conjunctiva were normal [Auscultation Breath Sounds / Voice Sounds] : lungs were clear to auscultation bilaterally [Heart Rate And Rhythm] : heart rate was normal and rhythm regular [Heart Sounds] : normal S1 and S2 [Heart Sounds Gallop] : no gallops [Murmurs] : no murmurs [Heart Sounds Pericardial Friction Rub] : no pericardial rub [Edema] : there was no peripheral edema [Abdomen Soft] : soft [Bowel Sounds] : normal bowel sounds [Abdomen Tenderness] : non-tender [Abdomen Mass (___ Cm)] : no abdominal mass palpated [] : no hepato-splenomegaly [Oriented To Time, Place, And Person] : oriented to person, place, and time

## 2019-10-07 ENCOUNTER — OTHER (OUTPATIENT)
Age: 39
End: 2019-10-07

## 2019-10-07 ENCOUNTER — TRANSCRIPTION ENCOUNTER (OUTPATIENT)
Age: 39
End: 2019-10-07

## 2019-10-15 ENCOUNTER — RX RENEWAL (OUTPATIENT)
Age: 39
End: 2019-10-15

## 2019-12-01 ENCOUNTER — OTHER (OUTPATIENT)
Age: 39
End: 2019-12-01

## 2019-12-06 ENCOUNTER — TRANSCRIPTION ENCOUNTER (OUTPATIENT)
Age: 39
End: 2019-12-06

## 2020-01-02 ENCOUNTER — APPOINTMENT (OUTPATIENT)
Dept: HEPATOLOGY | Facility: CLINIC | Age: 40
End: 2020-01-02
Payer: COMMERCIAL

## 2020-01-02 VITALS
TEMPERATURE: 98.2 F | HEIGHT: 74 IN | RESPIRATION RATE: 16 BRPM | BODY MASS INDEX: 38.89 KG/M2 | DIASTOLIC BLOOD PRESSURE: 87 MMHG | HEART RATE: 79 BPM | SYSTOLIC BLOOD PRESSURE: 137 MMHG | WEIGHT: 303 LBS

## 2020-01-02 LAB
ALBUMIN SERPL ELPH-MCNC: 4.2 G/DL
ALP BLD-CCNC: 224 U/L
ALT SERPL-CCNC: 48 U/L
ANION GAP SERPL CALC-SCNC: 12 MMOL/L
AST SERPL-CCNC: 41 U/L
BASOPHILS # BLD AUTO: 0.04 K/UL
BASOPHILS NFR BLD AUTO: 0.7 %
BILIRUB SERPL-MCNC: 0.4 MG/DL
BUN SERPL-MCNC: 10 MG/DL
CALCIUM SERPL-MCNC: 9.4 MG/DL
CHLORIDE SERPL-SCNC: 105 MMOL/L
CO2 SERPL-SCNC: 25 MMOL/L
CREAT SERPL-MCNC: 1.02 MG/DL
EOSINOPHIL # BLD AUTO: 0.1 K/UL
EOSINOPHIL NFR BLD AUTO: 1.7 %
HCT VFR BLD CALC: 44.9 %
HGB BLD-MCNC: 14.7 G/DL
IGA SER QL IEP: 238 MG/DL
IGG SER QL IEP: 1850 MG/DL
IGM SER QL IEP: 120 MG/DL
IMM GRANULOCYTES NFR BLD AUTO: 0.5 %
LYMPHOCYTES # BLD AUTO: 1.13 K/UL
LYMPHOCYTES NFR BLD AUTO: 19.2 %
MAN DIFF?: NORMAL
MCHC RBC-ENTMCNC: 32.1 PG
MCHC RBC-ENTMCNC: 32.7 GM/DL
MCV RBC AUTO: 98 FL
MONOCYTES # BLD AUTO: 0.63 K/UL
MONOCYTES NFR BLD AUTO: 10.7 %
NEUTROPHILS # BLD AUTO: 3.96 K/UL
NEUTROPHILS NFR BLD AUTO: 67.2 %
PLATELET # BLD AUTO: 238 K/UL
POTASSIUM SERPL-SCNC: 4.6 MMOL/L
PROT SERPL-MCNC: 7.8 G/DL
RBC # BLD: 4.58 M/UL
RBC # FLD: 12.9 %
SODIUM SERPL-SCNC: 142 MMOL/L
WBC # FLD AUTO: 5.89 K/UL

## 2020-01-02 PROCEDURE — 99214 OFFICE O/P EST MOD 30 MIN: CPT

## 2020-02-03 LAB
ALBUMIN SERPL ELPH-MCNC: 4.2 G/DL
ALP BLD-CCNC: 191 U/L
ALT SERPL-CCNC: 34 U/L
ANION GAP SERPL CALC-SCNC: 11 MMOL/L
AST SERPL-CCNC: 32 U/L
BILIRUB SERPL-MCNC: 0.4 MG/DL
BUN SERPL-MCNC: 9 MG/DL
CALCIUM SERPL-MCNC: 9.3 MG/DL
CHLORIDE SERPL-SCNC: 103 MMOL/L
CO2 SERPL-SCNC: 27 MMOL/L
CREAT SERPL-MCNC: 1.11 MG/DL
POTASSIUM SERPL-SCNC: 4.5 MMOL/L
PROT SERPL-MCNC: 7.6 G/DL
SODIUM SERPL-SCNC: 140 MMOL/L

## 2020-02-05 ENCOUNTER — TRANSCRIPTION ENCOUNTER (OUTPATIENT)
Age: 40
End: 2020-02-05

## 2020-05-03 LAB
ALBUMIN SERPL ELPH-MCNC: 4.3 G/DL
ALP BLD-CCNC: 198 U/L
ALT SERPL-CCNC: 48 U/L
ANION GAP SERPL CALC-SCNC: 16 MMOL/L
AST SERPL-CCNC: 49 U/L
BILIRUB SERPL-MCNC: 0.6 MG/DL
BUN SERPL-MCNC: 13 MG/DL
CALCIUM SERPL-MCNC: 9.4 MG/DL
CHLORIDE SERPL-SCNC: 106 MMOL/L
CO2 SERPL-SCNC: 20 MMOL/L
CREAT SERPL-MCNC: 0.95 MG/DL
POTASSIUM SERPL-SCNC: 4.2 MMOL/L
PROT SERPL-MCNC: 7.6 G/DL
SODIUM SERPL-SCNC: 143 MMOL/L

## 2020-05-06 ENCOUNTER — TRANSCRIPTION ENCOUNTER (OUTPATIENT)
Age: 40
End: 2020-05-06

## 2020-05-07 ENCOUNTER — APPOINTMENT (OUTPATIENT)
Dept: HEPATOLOGY | Facility: CLINIC | Age: 40
End: 2020-05-07
Payer: COMMERCIAL

## 2020-05-07 PROCEDURE — 99214 OFFICE O/P EST MOD 30 MIN: CPT | Mod: 95

## 2020-05-07 RX ORDER — PREDNISONE 1 MG/1
1 TABLET ORAL DAILY
Qty: 60 | Refills: 2 | Status: DISCONTINUED | COMMUNITY
Start: 2019-03-11 | End: 2020-05-07

## 2020-06-15 ENCOUNTER — TRANSCRIPTION ENCOUNTER (OUTPATIENT)
Age: 40
End: 2020-06-15

## 2020-06-15 LAB
ALBUMIN SERPL ELPH-MCNC: 4.2 G/DL
ALP BLD-CCNC: 201 U/L
ALT SERPL-CCNC: 41 U/L
ANION GAP SERPL CALC-SCNC: 13 MMOL/L
AST SERPL-CCNC: 47 U/L
BILIRUB SERPL-MCNC: 0.4 MG/DL
BUN SERPL-MCNC: 15 MG/DL
CALCIUM SERPL-MCNC: 9.2 MG/DL
CHLORIDE SERPL-SCNC: 109 MMOL/L
CO2 SERPL-SCNC: 23 MMOL/L
CREAT SERPL-MCNC: 0.92 MG/DL
POTASSIUM SERPL-SCNC: 4.7 MMOL/L
PROT SERPL-MCNC: 6.9 G/DL
SODIUM SERPL-SCNC: 145 MMOL/L

## 2020-07-28 ENCOUNTER — TRANSCRIPTION ENCOUNTER (OUTPATIENT)
Age: 40
End: 2020-07-28

## 2020-07-29 ENCOUNTER — TRANSCRIPTION ENCOUNTER (OUTPATIENT)
Age: 40
End: 2020-07-29

## 2020-07-30 ENCOUNTER — TRANSCRIPTION ENCOUNTER (OUTPATIENT)
Age: 40
End: 2020-07-30

## 2020-09-15 LAB
AFP-TM SERPL-MCNC: 3.1 NG/ML
ALBUMIN SERPL ELPH-MCNC: 4.4 G/DL
ALP BLD-CCNC: 191 U/L
ALT SERPL-CCNC: 28 U/L
ANION GAP SERPL CALC-SCNC: 13 MMOL/L
AST SERPL-CCNC: 32 U/L
BASOPHILS # BLD AUTO: 0.04 K/UL
BASOPHILS NFR BLD AUTO: 0.9 %
BILIRUB SERPL-MCNC: 0.5 MG/DL
BUN SERPL-MCNC: 14 MG/DL
CALCIUM SERPL-MCNC: 9.6 MG/DL
CHLORIDE SERPL-SCNC: 104 MMOL/L
CO2 SERPL-SCNC: 22 MMOL/L
CREAT SERPL-MCNC: 1.04 MG/DL
EOSINOPHIL # BLD AUTO: 0.15 K/UL
EOSINOPHIL NFR BLD AUTO: 3.3 %
HCT VFR BLD CALC: 42.7 %
HGB BLD-MCNC: 14.3 G/DL
IMM GRANULOCYTES NFR BLD AUTO: 0.2 %
INR PPP: 0.96 RATIO
LYMPHOCYTES # BLD AUTO: 1.34 K/UL
LYMPHOCYTES NFR BLD AUTO: 29.6 %
MAN DIFF?: NORMAL
MCHC RBC-ENTMCNC: 33.3 PG
MCHC RBC-ENTMCNC: 33.5 GM/DL
MCV RBC AUTO: 99.3 FL
MONOCYTES # BLD AUTO: 0.54 K/UL
MONOCYTES NFR BLD AUTO: 11.9 %
NEUTROPHILS # BLD AUTO: 2.45 K/UL
NEUTROPHILS NFR BLD AUTO: 54.1 %
PLATELET # BLD AUTO: 242 K/UL
POTASSIUM SERPL-SCNC: 4.4 MMOL/L
PROT SERPL-MCNC: 7.4 G/DL
PT BLD: 11.5 SEC
RBC # BLD: 4.3 M/UL
RBC # FLD: 13.2 %
SODIUM SERPL-SCNC: 140 MMOL/L
WBC # FLD AUTO: 4.53 K/UL

## 2020-09-16 ENCOUNTER — APPOINTMENT (OUTPATIENT)
Dept: HEPATOLOGY | Facility: CLINIC | Age: 40
End: 2020-09-16
Payer: COMMERCIAL

## 2020-09-16 PROCEDURE — 99215 OFFICE O/P EST HI 40 MIN: CPT | Mod: 95

## 2020-09-16 RX ORDER — IBUPROFEN 600 MG
600 TABLET ORAL EVERY 6 HOURS
Refills: 0 | Status: DISCONTINUED | COMMUNITY
End: 2020-09-16

## 2020-09-16 RX ORDER — CHROMIUM 200 MCG
TABLET ORAL
Refills: 0 | Status: DISCONTINUED | COMMUNITY
End: 2020-09-16

## 2020-09-16 NOTE — HISTORY OF PRESENT ILLNESS
[de-identified] : This visit was provided via telehealth using real time 2 way audio visual technology. The patient, CAROL CORDOBA, was located at home, [address] [place] [address] , at the time of the visit. The provider, BONNY BATEMAN, was located at [place], [place] at the time of the visit. The patient, CAROL CORDOBA and Provider participated in the Telehealth visit. Verbal consent for telehealth services was given on Sep 16, 2020 by the patient, CAROL CORDOBA. \par \par Mr. CAROL CORDOBA is 40 year old male who presents for the follow up appointment with AIH and NAFLD currently taking Rupert 1 g twice a day and azathioprine 100 mg a day. He feels well without complaints. He denies abdominal pain and pruritus.\par \par He was started on prednisone 30 mg daily with a rapid taper reducing by 5 mg every 5 days and completed in April. His blood tests at the end of taper in April showed normalization of ALT and AST with significant reduction of ALP by June but by His Azathioprine was increased to 100 mg daily and he was continued with prednisone 2 mg a day and Ursodiol 2 g a day. \par \par Liver biopsy from February 4, 2014 shows chronic hepatitis and portal fibrosis no evidence of bridging fibrosis or cirrhosis. It was non-diagnostic for autoimmune disease and there was some biliary ductal abnormalities. MRCP did not showed evidence of PSC. He reports being told that he have fatty liver on imaging in the past. \par \par Labs on 09/14/2020 AST 32, ALT 28, , Tbilli , AFP 3.1, WDL PT/INR, CBC.\par Blood test May 1, 2020 alkaline phosphatase 198, ALT 48, AST 49, total bilirubin 0.6\par Blood tests December 18, 2019 alkaline phosphatase 204, ALT 38, AST 36\par Blood test from 9/18/19 showed ALT 63, AST 56, and . \par June 2019 blood test showed rising ALT 52, AST 50, and .\par Blood tests on 2/7/19 showed a rise in liver markers, ,  and , IGG 2241. \par Complete liver workup from July 2018 was negative for viral hepatitis and PBC, ASMA was 1:40, CHARLIE 1:160 and IGG 1881.  \par \par Fibroscan test done 4/5/16 showed F4. \par Fibroscan test performed 9/13/17 showed F0, steatosis S0.\par \par MRE 8/28/19 showed stage 1-2 fibrosis with mild steatosis. \par Abdo MRI 8/13/18 showed no hepatic masses, bile ducts without choledocholithiasis or stricture, did not showed evidence of PSC. \par MRE from 8/13/18 showed stage 1 to 2 fibrosis, no significant steatosis. \par \par US from 7/21/18 no hepatic lesion, common bile duct.\par Abdominal ultrasound 3/8/17 showed no hepatic lesions. \par Abdominal sonogram from December 2, 2015 shows diffuse fatty infiltration and splenomegaly.\par \par

## 2020-09-16 NOTE — ASSESSMENT
[FreeTextEntry1] : Mr. CAROL CORDOBA is 40 year old male with abnormal liver enzymes who is being treated for autoimmune liver disease and fatty liver, currently taking Rupert 1 g twice a day and azathioprine 100 mg a day. He appears to be doing well and has lost weight while dieting.  \par \par PLAN to continue his medication regimen at this time. F/U with Dr. Manzano in 6 months.\par \par I have reviewed the treatment of fatty liver disease with the patient. I have explained that the best therapy is diet and exercise. I have reviewed and appropriate diet with the patient. I have recommended the avoidance of fatty foods and to follow a good healthy heart diet. I have explained that weight loss may lead to an improvement in the underlying liver disease state. \par \par I reviewed with the patient the side effects of azathioprine. I have reviewed the risks of nausea, vomiting, abdominal discomfort, hepatotoxicity, rash, leukocytopenia. I have also reviewed the theoretical risks of development of malignancy.\par \par Encouraged to call back in the interim with any issues or concerns so that we can address and assist as required.

## 2020-10-12 ENCOUNTER — RX RENEWAL (OUTPATIENT)
Age: 40
End: 2020-10-12

## 2020-12-23 ENCOUNTER — TRANSCRIPTION ENCOUNTER (OUTPATIENT)
Age: 40
End: 2020-12-23

## 2021-01-01 ENCOUNTER — TRANSCRIPTION ENCOUNTER (OUTPATIENT)
Age: 41
End: 2021-01-01

## 2021-01-04 ENCOUNTER — TRANSCRIPTION ENCOUNTER (OUTPATIENT)
Age: 41
End: 2021-01-04

## 2021-03-02 ENCOUNTER — TRANSCRIPTION ENCOUNTER (OUTPATIENT)
Age: 41
End: 2021-03-02

## 2021-03-04 ENCOUNTER — APPOINTMENT (OUTPATIENT)
Dept: ULTRASOUND IMAGING | Facility: CLINIC | Age: 41
End: 2021-03-04
Payer: COMMERCIAL

## 2021-03-04 ENCOUNTER — OUTPATIENT (OUTPATIENT)
Dept: OUTPATIENT SERVICES | Facility: HOSPITAL | Age: 41
LOS: 1 days | End: 2021-03-04

## 2021-03-04 ENCOUNTER — RESULT REVIEW (OUTPATIENT)
Age: 41
End: 2021-03-04

## 2021-03-04 DIAGNOSIS — R74.8 ABNORMAL LEVELS OF OTHER SERUM ENZYMES: ICD-10-CM

## 2021-03-04 PROCEDURE — 76700 US EXAM ABDOM COMPLETE: CPT | Mod: 26

## 2021-03-08 LAB
AFP-TM SERPL-MCNC: 3.6 NG/ML
ALBUMIN SERPL ELPH-MCNC: 4.2 G/DL
ALP BLD-CCNC: 385 U/L
ALT SERPL-CCNC: 62 U/L
ANION GAP SERPL CALC-SCNC: 9 MMOL/L
AST SERPL-CCNC: 61 U/L
BASOPHILS # BLD AUTO: 0.03 K/UL
BASOPHILS NFR BLD AUTO: 0.6 %
BILIRUB SERPL-MCNC: 0.6 MG/DL
BUN SERPL-MCNC: 20 MG/DL
CALCIUM SERPL-MCNC: 9.5 MG/DL
CHLORIDE SERPL-SCNC: 106 MMOL/L
CO2 SERPL-SCNC: 26 MMOL/L
CREAT SERPL-MCNC: 1.02 MG/DL
EOSINOPHIL # BLD AUTO: 0.21 K/UL
EOSINOPHIL NFR BLD AUTO: 4.4 %
HCT VFR BLD CALC: 44 %
HGB BLD-MCNC: 14.7 G/DL
IMM GRANULOCYTES NFR BLD AUTO: 0.2 %
INR PPP: 0.99 RATIO
LYMPHOCYTES # BLD AUTO: 1.08 K/UL
LYMPHOCYTES NFR BLD AUTO: 22.5 %
MAN DIFF?: NORMAL
MCHC RBC-ENTMCNC: 32 PG
MCHC RBC-ENTMCNC: 33.4 GM/DL
MCV RBC AUTO: 95.9 FL
MONOCYTES # BLD AUTO: 0.54 K/UL
MONOCYTES NFR BLD AUTO: 11.3 %
NEUTROPHILS # BLD AUTO: 2.93 K/UL
NEUTROPHILS NFR BLD AUTO: 61 %
PLATELET # BLD AUTO: 235 K/UL
POTASSIUM SERPL-SCNC: 4.5 MMOL/L
PROT SERPL-MCNC: 8.3 G/DL
PT BLD: 11.7 SEC
RBC # BLD: 4.59 M/UL
RBC # FLD: 12.5 %
SODIUM SERPL-SCNC: 141 MMOL/L
WBC # FLD AUTO: 4.8 K/UL

## 2021-03-17 ENCOUNTER — APPOINTMENT (OUTPATIENT)
Dept: HEPATOLOGY | Facility: CLINIC | Age: 41
End: 2021-03-17
Payer: COMMERCIAL

## 2021-03-17 VITALS
HEIGHT: 74 IN | HEART RATE: 80 BPM | TEMPERATURE: 97.9 F | WEIGHT: 273 LBS | RESPIRATION RATE: 16 BRPM | SYSTOLIC BLOOD PRESSURE: 136 MMHG | BODY MASS INDEX: 35.04 KG/M2 | DIASTOLIC BLOOD PRESSURE: 90 MMHG

## 2021-03-17 PROCEDURE — 99072 ADDL SUPL MATRL&STAF TM PHE: CPT

## 2021-03-17 PROCEDURE — 99215 OFFICE O/P EST HI 40 MIN: CPT

## 2021-03-17 NOTE — ASSESSMENT
[FreeTextEntry1] : Mr. CAROL CORDOBA is 40 year old male with abnormal liver enzymes who is being treated for autoimmune liver disease with Ursodiol and azathioprine. He appears to be doing well and has lost weight while dieting. \par \par # Elevated Liver Enzymes – Reviewed Labs on 03/05/2021 shows Elevated AST/ALT 61/62,  with WDL- Tbil 0.6, CBC, BUN/Cr, AFP, PT/INR, Reported Lipids and A1C from PCP. On Ursodiol 2 g/day and Azathioprine 100 mg/day for AIH. In past was on prednisone for tapered dose to normalize in Apr 2019. He does not want to restart on steroids, Will recheck lab in a month and advised to call back to discuss the results. \par I reviewed with the patient the side effects of azathioprine. I have reviewed the risks of nausea, vomiting, abdominal discomfort, hepatotoxicity, rash, leukocytopenia. I have also reviewed the theoretical risks of development of malignancy. Advised to F/U with dermatology for skin surveillance.\par \par # Fatty liver – Fibroscan was not done today as he had the breakfast this morning. Reviewed Abdominal Ultrasound results done on 03/04/2021 shows steatosis, Now BMI 35<38 in Jan 2020. I have reviewed the treatment of fatty liver disease with the patient. I have explained that the best therapy is diet and exercise. I have reviewed and appropriate diet with the patient. I have recommended the avoidance of fatty foods and to follow a good healthy heart diet. I have explained that weight loss may lead to an improvement in the underlying liver disease state. \par \par # Mild splenomegaly – Slightly increased when compared to prior US ab 13.6 <13.1, No focal lesions, No Ascites and no biliary ductal dilatation. Will continue to monitor.\par # Immunity – Immune to HBV but not to HAV as per labs on 07/26/2018. Advised to get the HAV and COVID vaccine. Discussed the concerns of COVID vaccine and answered the questions to the best of my knowledge. Encouraged to take vaccine when available or offered to public. Advised to call PCP with any side effects or concerns from the vaccine itself. \par \par PLAN to F/U in 3 months with labs and Annual Fibroscan and US ab due in March 2022.\par Encouraged to call back in the interim with any issues or concerns so that we can address and assist as required.\par

## 2021-03-17 NOTE — PHYSICAL EXAM
[Scleral Icterus] : No Scleral Icterus [Abdominal Bruit] : no abdominal bruit [Abdominal  Ascites] : no ascites [Splenomegaly] : splenomegaly [Non-Tender] : non-tender [Asterixis] : no asterixis observed [Jaundice] : No jaundice [Palmar Erythema] : no Palmar Erythema [Depression] : no depression [General Appearance - Alert] : alert [General Appearance - Well Nourished] : well nourished [General Appearance - Well-Appearing] : healthy appearing [Outer Ear] : the ears and nose were normal in appearance [Neck Cervical Mass (___cm)] : no neck mass was observed [Exaggerated Use Of Accessory Muscles For Inspiration] : no accessory muscle use [Apical Impulse] : the apical impulse was normal [Heart Sounds] : normal S1 and S2 [Edema] : there was no peripheral edema [Veins - Varicosity Changes] : there were no varicosital changes [Cervical Lymph Nodes Enlarged Posterior Bilaterally] : posterior cervical [Supraclavicular Lymph Nodes Enlarged Bilaterally] : supraclavicular [Abnormal Walk] : normal gait [Nail Clubbing] : no clubbing  or cyanosis of the fingernails [Involuntary Movements] : no involuntary movements were seen [] : no rash [FreeTextEntry1] : Raised area noticed on the left side of forehead.  [Oriented To Time, Place, And Person] : oriented to person, place, and time

## 2021-03-17 NOTE — HISTORY OF PRESENT ILLNESS
[de-identified] : Mr. CAROL CORDOBA is 40 year old male who presents for the follow up appointment with Elevated Liver enzymes, NAFLD and AIH currently taking Ursodiol 2 gm/day and azathioprine 100 mg/day. Tolerates without any side effects. He feels well without complaints. He denies abdominal pain and pruritus. Now BMI 35<38 since Jan 2020.\par \par Pertinent H/O On prednisone 30 mg daily with a rapid taper reducing by 5 mg every 5 days and completed in April. His blood tests at the end of taper in April showed normalization of ALT and AST with significant reduction of ALP by June but by his Azathioprine was increased to 100 mg daily and he was continued with prednisone 2 mg a day and Ursodiol 2 g a day. \par \par Liver biopsy from February 4, 2014 shows chronic hepatitis and portal fibrosis no evidence of bridging fibrosis or cirrhosis. It was non-diagnostic for autoimmune disease and there was some biliary ductal abnormalities. MRCP did not showed evidence of PSC. He reports being told that he have fatty liver on imaging in the past. \par \par Fibroscan test performed 9/13/17 showed F0, steatosis S0. Fibroscan test done 4/5/16 showed F4. \par \par Labs on 03/05/2021 shows Elevated AST/ALT 61/62 ,  with WDL- Tbil 0.6, CBC, BUN/Cr, AFP, PT/INR, Reported Lipids and A1C from PCP. Immune to HBV but not to HAV as per labs on 07/26/2018. Abdominal Ultrasound results done on 03/04/2021 shows steatosis ans Mild splenomegaly 13.6 <13.1, No focal lesions, No Ascites and no biliary ductal dilatation. \par \par Labs on 09/14/2020 AST 32, ALT 28, , Tbilli , AFP 3.1, WDL PT/INR, CBC. Blood test May 1, 2020 alkaline phosphatase 198, ALT 48, AST 49, total bilirubin 0.6\par \par Blood tests December 18, 2019 alkaline phosphatase 204, ALT 38, AST 36. Blood test from 9/18/19 showed ALT 63, AST 56, and . MRE 8/28/19 showed stage 1-2 fibrosis with mild steatosis. June 2019 blood test showed rising ALT 52, AST 50, and .\par Abdominal ultrasound 3/8/17 showed no hepatic lesions. Blood tests on 2/7/19 showed a rise in liver markers, ,  and , IGG 2241. \par \par Abdo MRI 8/13/18 showed no hepatic masses, bile ducts without choledocholithiasis or stricture, did not showed evidence of PSC. MRE from 8/13/18 showed stage 1 to 2 fibrosis, no significant steatosis.  \par US from 7/21/18 no hepatic lesion, common bile duct. Complete liver workup from July 2018 was negative for viral hepatitis and PBC, ASMA was 1:40, CHARLIE 1:160 and IGG 1881.  \par Abdominal sonogram from December 2, 2015 shows diffuse fatty infiltration and splenomegaly.\par \par \par

## 2021-03-19 ENCOUNTER — APPOINTMENT (OUTPATIENT)
Dept: HEPATOLOGY | Facility: CLINIC | Age: 41
End: 2021-03-19
Payer: COMMERCIAL

## 2021-03-19 PROCEDURE — 91200 LIVER ELASTOGRAPHY: CPT

## 2021-03-19 PROCEDURE — 99072 ADDL SUPL MATRL&STAF TM PHE: CPT

## 2021-03-29 ENCOUNTER — TRANSCRIPTION ENCOUNTER (OUTPATIENT)
Age: 41
End: 2021-03-29

## 2021-04-08 NOTE — ASSESSMENT
[FreeTextEntry1] : 38-year-old gentleman with abnormal liver enzymes who is being treated for autoimmune liver disease. 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 He also has fatty liver based on imaging. His blood tests on 2/7/19 showed a rise in liver markers, ,  and , IGG 2241. He was started on prednisone 30 mg daily with a rapid taper reducing by 5 mg every 5 days  and completed in April. He is currently taking prednisone 2 mg a day Rupert 2 g a day and Imuran 75 mg a day. His blood tests at the end of taper in April showed normal ALT and AST with significant reduction of ALP but now rising ALT 52, AST 50, . I recommended increasing  azathioprine to 100 mg daily and continue with ursodiol same dosage. He will repeat labs in 1 month. \par \par  He will continue with diet and exercise.   \par \par I have reviewed the treatment of fatty liver disease with the patient. I have explained that the best therapy is diet and exercise. I have reviewed and appropriate diet with the patient. I have recommended the avoidance of fatty foods and to follow a good healthy heart diet. I have explained that weight loss may lead to an improvement in the underlying liver disease state. \par \par I have reviewed the side effects of prednisone with the patient. I have reviewed the risks of long term treatment. I have explained the risks of developing acne, moon-shaped facies, abdominal striae, a dorsal hump, weight gain, diabetes, cataracts, hypertension, and ischemic necrosis of the hips. I have explained that there may be other side effects as well related to prednisone treatment. I had reviewed with the patient the side effects of azathioprine. I have reviewed the risks of nausea, vomiting, abdominal discomfort, hepatotoxicity, rash, leukocytopenia. I have also reviewed the theoretical risks of development of malignancy.\par \par I reviewed with the patient the side effects of azathioprine. I have reviewed the risks of nausea, vomiting, abdominal discomfort, hepatotoxicity, rash, leukocytopenia. I have also reviewed the theoretical risks of development of malignancy.\par \par I will see him in 3 months.  No

## 2021-06-29 ENCOUNTER — NON-APPOINTMENT (OUTPATIENT)
Age: 41
End: 2021-06-29

## 2021-07-15 ENCOUNTER — APPOINTMENT (OUTPATIENT)
Dept: DERMATOLOGY | Facility: CLINIC | Age: 41
End: 2021-07-15

## 2021-07-15 LAB
ALBUMIN SERPL ELPH-MCNC: 4.5 G/DL
ALP BLD-CCNC: 276 U/L
ALT SERPL-CCNC: 52 U/L
ANION GAP SERPL CALC-SCNC: 14 MMOL/L
AST SERPL-CCNC: 54 U/L
BILIRUB SERPL-MCNC: 0.9 MG/DL
BUN SERPL-MCNC: 17 MG/DL
CALCIUM SERPL-MCNC: 9.7 MG/DL
CHLORIDE SERPL-SCNC: 101 MMOL/L
CO2 SERPL-SCNC: 22 MMOL/L
CREAT SERPL-MCNC: 1.02 MG/DL
IGG SER QL IEP: 2037 MG/DL
POTASSIUM SERPL-SCNC: 4.3 MMOL/L
PROT SERPL-MCNC: 8.2 G/DL
SODIUM SERPL-SCNC: 137 MMOL/L

## 2021-07-16 ENCOUNTER — TRANSCRIPTION ENCOUNTER (OUTPATIENT)
Age: 41
End: 2021-07-16

## 2021-07-19 ENCOUNTER — TRANSCRIPTION ENCOUNTER (OUTPATIENT)
Age: 41
End: 2021-07-19

## 2021-08-24 LAB
ALBUMIN SERPL ELPH-MCNC: 4.2 G/DL
ALP BLD-CCNC: 266 U/L
ALT SERPL-CCNC: 55 U/L
ANION GAP SERPL CALC-SCNC: 13 MMOL/L
AST SERPL-CCNC: 50 U/L
BASOPHILS # BLD AUTO: 0.04 K/UL
BASOPHILS NFR BLD AUTO: 0.8 %
BILIRUB SERPL-MCNC: 0.6 MG/DL
BUN SERPL-MCNC: 9 MG/DL
CALCIUM SERPL-MCNC: 9.3 MG/DL
CHLORIDE SERPL-SCNC: 103 MMOL/L
CO2 SERPL-SCNC: 26 MMOL/L
CREAT SERPL-MCNC: 0.92 MG/DL
EOSINOPHIL # BLD AUTO: 0.21 K/UL
EOSINOPHIL NFR BLD AUTO: 4 %
HCT VFR BLD CALC: 43.2 %
HGB BLD-MCNC: 14 G/DL
IGG SER QL IEP: 1949 MG/DL
IMM GRANULOCYTES NFR BLD AUTO: 0.2 %
LYMPHOCYTES # BLD AUTO: 1.44 K/UL
LYMPHOCYTES NFR BLD AUTO: 27.4 %
MAN DIFF?: NORMAL
MCHC RBC-ENTMCNC: 32.4 GM/DL
MCHC RBC-ENTMCNC: 32.8 PG
MCV RBC AUTO: 101.2 FL
MONOCYTES # BLD AUTO: 0.54 K/UL
MONOCYTES NFR BLD AUTO: 10.3 %
NEUTROPHILS # BLD AUTO: 3.01 K/UL
NEUTROPHILS NFR BLD AUTO: 57.3 %
PLATELET # BLD AUTO: 216 K/UL
POTASSIUM SERPL-SCNC: 4.2 MMOL/L
PROT SERPL-MCNC: 7.8 G/DL
RBC # BLD: 4.27 M/UL
RBC # FLD: 13.2 %
SODIUM SERPL-SCNC: 143 MMOL/L
WBC # FLD AUTO: 5.25 K/UL

## 2021-08-25 ENCOUNTER — APPOINTMENT (OUTPATIENT)
Dept: HEPATOLOGY | Facility: CLINIC | Age: 41
End: 2021-08-25
Payer: COMMERCIAL

## 2021-08-25 VITALS
DIASTOLIC BLOOD PRESSURE: 94 MMHG | HEIGHT: 74 IN | TEMPERATURE: 97.9 F | BODY MASS INDEX: 35.94 KG/M2 | WEIGHT: 280 LBS | RESPIRATION RATE: 16 BRPM | HEART RATE: 85 BPM | SYSTOLIC BLOOD PRESSURE: 153 MMHG

## 2021-08-25 PROCEDURE — 99214 OFFICE O/P EST MOD 30 MIN: CPT

## 2021-08-25 NOTE — ASSESSMENT
[FreeTextEntry1] : 41-year-old with abnormal liver enzymes who is being treated for autoimmune liver disease and fatty liver.  He has gained weight since last being seen.  His liver tests remain elevated but stable.  I have discussed with him at length regarding this.  His last liver biopsy was in 2014.  I have recommended that a liver biopsy be performed to better stage his underlying disease and determine if he has significant inflammation.  I have reviewed the risks and benefits of this with him.  He is agreeable.  He would like to have the biopsy performed at [facility].  I have asked him to call me after the biopsy is done so I can have the slide read by our hepatic pathologist here at [facility]\par  \par I spoke with the patient at length regarding fatty liver disease. I have reviewed the spectrum of disease as well as the risk of disease progression. I have explained that fatty liver disease may progress to the development of cirrhosis and its complications. I have also explained that patients with fatty liver disease may develop liver cancer without cirrhosis and therefore should be screened yearly with an abdominal ultrasound. I have explained that fatty liver disease is commonly seen in patients with diabetes or those who are overweight. I have explained that fatty liver disease may also be a precursor to the development of diabetes as it is part of the metabolic syndrome. I have reviewed the treatment of fatty liver disease. I have explained that the best therapy is diet and exercise. I have reviewed an appropriate diet with the patient. I have recommended the avoidance of fatty foods and to follow a good healthy heart diet. I have explained that weight loss may lead to an improvement in the underlying liver disease state. I have recommended the avoidance of alcohol.\par \par \par I reviewed with the patient the side effects of azathioprine. I have reviewed the risks of nausea, vomiting, abdominal discomfort, hepatotoxicity, rash, leukocytopenia. I have also reviewed the theoretical risks of development of malignancy.\par \par I would like to see him back after the liver biopsy is performed

## 2021-08-25 NOTE — HISTORY OF PRESENT ILLNESS
[de-identified] : Melchor comes in today for follow-up.  He has gained about 7 pounds since last being seen.  He has a history of autoimmune hepatitis and fatty liver and has been taking Rupert 2 g a day and azathioprine 100 mg a day.  He reports feeling well without any complaints.  He has no pruritus.  He has no abdominal pain.\par \par Blood test August 23, 2021 alkaline phosphatase 266, ALT 55, AST 50, total bilirubin 0.6, creatinine 0.96\par \par Blood test May 1, 2020 alkaline phosphatase 198, ALT 48, AST 49, total bilirubin 0.6\par \par Blood tests December 18, 2019 alkaline phosphatase 204, ALT 38, AST 36\par \par Blood tests on 2/7/19  ,  and , IGG 2241. He was started on prednisone 30 mg daily with a rapid taper reducing by 5 mg every 5 days  and completed in April. His blood tests at the end of taper in April showed normalization of ALT and AST with significant reduction of ALP but by June blood test showed rising ALT 52, AST 50, . His Azathioprine was increased to 100 mg daily and he was continued with prednisone 2 mg a day and Ursodiol 2 g a day. His blood test from 9/18/19 showed ALT 63, AST 56, . \par \par Liver biopsy from February 4, 2014 shows chronic hepatitis and portal fibrosis no evidence of bridging fibrosis or cirrhosis. It was nondiagnostic for autoimmune disease and there was some biliary ductal abnormalities. MRCP did not showed evidence of PSC. He reports being told that he have fatty liver on imaging in the past. \par \par Blood tests on 2/7/19 showed a rise in liver markers, ,  and , IGG 2241. He has not started new supplements/herbal products or medications. No alcohol use. No recent illness. His complete liver workup from July 2018  was negative for viral hepatitis and PBC, ASMA was 1:40, CHARLIE 1:160 and IGG 1881.  MRI from Aug 2018 did not showed evidence of PSC. \par \par Fibroscan test done 4/5/16 showed F4. \par Fibroscan test performed 9/13/17 showed F0, steatosis S0.\par MRE from 8/13/18 showed stage 1 to 2 fibrosis, no significant steatosis. \par MRE 8/28/19 showed stage 1-2 fibrosis with mild steatosis. \par \par Abdo MRI 8/13/18 showed no hepatic masses, bile ducts without choledocholithiasis or stricture, elastrography showed no fibrosis. \par \par US from 7/21/18 no hepatic lesion, common bile duct and pancreas not well visualized due to excessive overlying bowel gas.\par \par Abdominal ultrasound 3/8/17 showed no hepatic lesions. \par \par Abdominal sonogram from December 2, 2015 shows diffuse fatty infiltration and splenomegaly.\par \par

## 2021-08-25 NOTE — PHYSICAL EXAM
[Liver Size (___ Cm)] : Liver size [unfilled] cm [Non-Tender] : non-tender [Cognitive Mini-Mental Status Normal?] : Cognitive Mini Mental Status Exam is normal [General Appearance - Alert] : alert [General Appearance - In No Acute Distress] : in no acute distress [Sclera] : the sclera and conjunctiva were normal [Outer Ear] : the ears and nose were normal in appearance [Neck Appearance] : the appearance of the neck was normal [Neck Cervical Mass (___cm)] : no neck mass was observed [Jugular Venous Distention Increased] : there was no jugular-venous distention [Thyroid Diffuse Enlargement] : the thyroid was not enlarged [Thyroid Nodule] : there were no palpable thyroid nodules [Auscultation Breath Sounds / Voice Sounds] : lungs were clear to auscultation bilaterally [Heart Rate And Rhythm] : heart rate was normal and rhythm regular [Heart Sounds] : normal S1 and S2 [Heart Sounds Gallop] : no gallops [Murmurs] : no murmurs [Heart Sounds Pericardial Friction Rub] : no pericardial rub [Edema] : there was no peripheral edema [Bowel Sounds] : normal bowel sounds [Abdomen Tenderness] : non-tender [Abdomen Soft] : soft [Abdomen Mass (___ Cm)] : no abdominal mass palpated [No CVA Tenderness] : no ~M costovertebral angle tenderness [No Spinal Tenderness] : no spinal tenderness [Abnormal Walk] : normal gait [Nail Clubbing] : no clubbing  or cyanosis of the fingernails [Musculoskeletal - Swelling] : no joint swelling seen [Motor Tone] : muscle strength and tone were normal [Skin Color & Pigmentation] : normal skin color and pigmentation [Skin Turgor] : normal skin turgor [] : no rash [Deep Tendon Reflexes (DTR)] : deep tendon reflexes were 2+ and symmetric [No Focal Deficits] : no focal deficits [Sensation] : the sensory exam was normal to light touch and pinprick [Oriented To Time, Place, And Person] : oriented to person, place, and time [Impaired Insight] : insight and judgment were intact [Affect] : the affect was normal [Scleral Icterus] : No Scleral Icterus [Hepatojugular Reflux] : patient did not have a sustained hepatojugular reflux [Spider Angioma] : No spider angioma(s) were observed [Abdominal Bruit] : no abdominal bruit [Splenomegaly] : no splenomegaly [Asterixis] : no asterixis observed [Jaundice] : No jaundice [Palmar Erythema] : no Palmar Erythema [Depression] : no depression

## 2021-09-17 ENCOUNTER — OUTPATIENT (OUTPATIENT)
Dept: OUTPATIENT SERVICES | Facility: HOSPITAL | Age: 41
LOS: 1 days | End: 2021-09-17
Payer: COMMERCIAL

## 2021-09-17 DIAGNOSIS — Z01.818 ENCOUNTER FOR OTHER PREPROCEDURAL EXAMINATION: ICD-10-CM

## 2021-09-17 LAB
ALBUMIN SERPL ELPH-MCNC: 4.3 G/DL — SIGNIFICANT CHANGE UP (ref 3.3–5.2)
ALP SERPL-CCNC: 228 U/L — HIGH (ref 40–120)
ALT FLD-CCNC: 47 U/L — HIGH
ANION GAP SERPL CALC-SCNC: 11 MMOL/L — SIGNIFICANT CHANGE UP (ref 5–17)
APTT BLD: 32 SEC — SIGNIFICANT CHANGE UP (ref 27.5–35.5)
AST SERPL-CCNC: 51 U/L — HIGH
BASOPHILS # BLD AUTO: 0.03 K/UL — SIGNIFICANT CHANGE UP (ref 0–0.2)
BASOPHILS NFR BLD AUTO: 0.6 % — SIGNIFICANT CHANGE UP (ref 0–2)
BILIRUB SERPL-MCNC: 0.8 MG/DL — SIGNIFICANT CHANGE UP (ref 0.4–2)
BUN SERPL-MCNC: 12.1 MG/DL — SIGNIFICANT CHANGE UP (ref 8–20)
CALCIUM SERPL-MCNC: 9.6 MG/DL — SIGNIFICANT CHANGE UP (ref 8.6–10.2)
CHLORIDE SERPL-SCNC: 103 MMOL/L — SIGNIFICANT CHANGE UP (ref 98–107)
CO2 SERPL-SCNC: 24 MMOL/L — SIGNIFICANT CHANGE UP (ref 22–29)
CREAT SERPL-MCNC: 0.82 MG/DL — SIGNIFICANT CHANGE UP (ref 0.5–1.3)
EOSINOPHIL # BLD AUTO: 0.23 K/UL — SIGNIFICANT CHANGE UP (ref 0–0.5)
EOSINOPHIL NFR BLD AUTO: 4.8 % — SIGNIFICANT CHANGE UP (ref 0–6)
GLUCOSE SERPL-MCNC: 98 MG/DL — SIGNIFICANT CHANGE UP (ref 70–99)
HCT VFR BLD CALC: 42.8 % — SIGNIFICANT CHANGE UP (ref 39–50)
HGB BLD-MCNC: 14.7 G/DL — SIGNIFICANT CHANGE UP (ref 13–17)
IMM GRANULOCYTES NFR BLD AUTO: 0.2 % — SIGNIFICANT CHANGE UP (ref 0–1.5)
INR BLD: 0.93 RATIO — SIGNIFICANT CHANGE UP (ref 0.88–1.16)
LYMPHOCYTES # BLD AUTO: 1.09 K/UL — SIGNIFICANT CHANGE UP (ref 1–3.3)
LYMPHOCYTES # BLD AUTO: 22.9 % — SIGNIFICANT CHANGE UP (ref 13–44)
MCHC RBC-ENTMCNC: 32.2 PG — SIGNIFICANT CHANGE UP (ref 27–34)
MCHC RBC-ENTMCNC: 34.3 GM/DL — SIGNIFICANT CHANGE UP (ref 32–36)
MCV RBC AUTO: 93.7 FL — SIGNIFICANT CHANGE UP (ref 80–100)
MONOCYTES # BLD AUTO: 0.47 K/UL — SIGNIFICANT CHANGE UP (ref 0–0.9)
MONOCYTES NFR BLD AUTO: 9.9 % — SIGNIFICANT CHANGE UP (ref 2–14)
NEUTROPHILS # BLD AUTO: 2.92 K/UL — SIGNIFICANT CHANGE UP (ref 1.8–7.4)
NEUTROPHILS NFR BLD AUTO: 61.6 % — SIGNIFICANT CHANGE UP (ref 43–77)
PLATELET # BLD AUTO: 218 K/UL — SIGNIFICANT CHANGE UP (ref 150–400)
POTASSIUM SERPL-MCNC: 4.6 MMOL/L — SIGNIFICANT CHANGE UP (ref 3.5–5.3)
POTASSIUM SERPL-SCNC: 4.6 MMOL/L — SIGNIFICANT CHANGE UP (ref 3.5–5.3)
PROT SERPL-MCNC: 8.3 G/DL — SIGNIFICANT CHANGE UP (ref 6.6–8.7)
PROTHROM AB SERPL-ACNC: 10.8 SEC — SIGNIFICANT CHANGE UP (ref 10.6–13.6)
RBC # BLD: 4.57 M/UL — SIGNIFICANT CHANGE UP (ref 4.2–5.8)
RBC # FLD: 12.6 % — SIGNIFICANT CHANGE UP (ref 10.3–14.5)
SODIUM SERPL-SCNC: 138 MMOL/L — SIGNIFICANT CHANGE UP (ref 135–145)
WBC # BLD: 4.75 K/UL — SIGNIFICANT CHANGE UP (ref 3.8–10.5)
WBC # FLD AUTO: 4.75 K/UL — SIGNIFICANT CHANGE UP (ref 3.8–10.5)

## 2021-09-17 PROCEDURE — 36415 COLL VENOUS BLD VENIPUNCTURE: CPT

## 2021-09-17 PROCEDURE — G0463: CPT

## 2021-09-17 PROCEDURE — 80053 COMPREHEN METABOLIC PANEL: CPT

## 2021-09-17 PROCEDURE — 85025 COMPLETE CBC W/AUTO DIFF WBC: CPT

## 2021-09-17 PROCEDURE — 85730 THROMBOPLASTIN TIME PARTIAL: CPT

## 2021-09-17 PROCEDURE — 85610 PROTHROMBIN TIME: CPT

## 2021-09-18 DIAGNOSIS — Z01.818 ENCOUNTER FOR OTHER PREPROCEDURAL EXAMINATION: ICD-10-CM

## 2021-09-19 ENCOUNTER — APPOINTMENT (OUTPATIENT)
Dept: DISASTER EMERGENCY | Facility: CLINIC | Age: 41
End: 2021-09-19

## 2021-09-20 LAB — SARS-COV-2 N GENE NPH QL NAA+PROBE: NOT DETECTED

## 2021-09-22 ENCOUNTER — RESULT REVIEW (OUTPATIENT)
Age: 41
End: 2021-09-22

## 2021-09-22 ENCOUNTER — TRANSCRIPTION ENCOUNTER (OUTPATIENT)
Age: 41
End: 2021-09-22

## 2021-09-22 ENCOUNTER — OUTPATIENT (OUTPATIENT)
Dept: OUTPATIENT SERVICES | Facility: HOSPITAL | Age: 41
LOS: 1 days | End: 2021-09-22
Payer: COMMERCIAL

## 2021-09-22 VITALS
RESPIRATION RATE: 18 BRPM | OXYGEN SATURATION: 98 % | SYSTOLIC BLOOD PRESSURE: 125 MMHG | DIASTOLIC BLOOD PRESSURE: 82 MMHG | HEART RATE: 60 BPM

## 2021-09-22 VITALS
OXYGEN SATURATION: 95 % | DIASTOLIC BLOOD PRESSURE: 92 MMHG | RESPIRATION RATE: 16 BRPM | WEIGHT: 279.99 LBS | HEIGHT: 74 IN | HEART RATE: 94 BPM | SYSTOLIC BLOOD PRESSURE: 143 MMHG | TEMPERATURE: 97 F

## 2021-09-22 DIAGNOSIS — K75.4 AUTOIMMUNE HEPATITIS: ICD-10-CM

## 2021-09-22 PROCEDURE — 76942 ECHO GUIDE FOR BIOPSY: CPT

## 2021-09-22 PROCEDURE — 88307 TISSUE EXAM BY PATHOLOGIST: CPT

## 2021-09-22 PROCEDURE — 88341 IMHCHEM/IMCYTCHM EA ADD ANTB: CPT

## 2021-09-22 PROCEDURE — 88341 IMHCHEM/IMCYTCHM EA ADD ANTB: CPT | Mod: 26

## 2021-09-22 PROCEDURE — 47000 NEEDLE BIOPSY OF LIVER PERQ: CPT

## 2021-09-22 PROCEDURE — 88307 TISSUE EXAM BY PATHOLOGIST: CPT | Mod: 26

## 2021-09-22 PROCEDURE — 76942 ECHO GUIDE FOR BIOPSY: CPT | Mod: 26

## 2021-09-22 PROCEDURE — 88342 IMHCHEM/IMCYTCHM 1ST ANTB: CPT | Mod: 26

## 2021-09-22 NOTE — ASU DISCHARGE PLAN (ADULT/PEDIATRIC) - ASU DC SPECIAL INSTRUCTIONSFT
Biopsy/ Aspiration    Discharge Instructions  - You have had a biopsy of your liver.   - You may shower in 24 hours.  - Keep the area covered and dry for the next 24 hours.  - Do not perform any heavy lifting until the site is healed.  - You may resume your normal diet.  - You may resume your normal medications however you should wait 24 hours before restarting aspirin, plavix, or blood thinners.  - It is normal to experience some pain over the site for the next few days. You may take apply ice to the area (20 minutes on, 20 minutes off) and take Tylenol for that pain. Do not take more frequently than every 6 hours and do not exceed more than 3000mg of Tylenol in a 24 hour period.    Notify your primary physician and/or Interventional Radiology IMMEDIATELY if you experience any of the following       - Fever of 101F or 38C       - Chills or Rigors/ Shakes       - Swelling and/or Redness in the area around the biopsy site       - Worsening Pain       - Blood soaked bandages or worsening bleeding       - Lightheadedness and/or dizziness upon standing       - Chest Pain/ Tightness       - Shortness of Breath       - Difficulty walking    If you have a problem that you believe requires IMMEDIATE attention, please go to your NEAREST Emergency Room. If you believe your problem can safely wait until you speak to a physician, please call Interventional Radiology for any concerns.    During Normal Weekday Business Hours- You can contact the Interventional Radiology department during normal business hours via telephone.  During Evenings and Weekends- If you need to contact Interventional Radiology during off hours, do so by calling the hospital and requesting to be connected to the Interventional Radiologist on call.

## 2021-09-29 ENCOUNTER — APPOINTMENT (OUTPATIENT)
Dept: HEPATOLOGY | Facility: CLINIC | Age: 41
End: 2021-09-29
Payer: COMMERCIAL

## 2021-09-29 VITALS
BODY MASS INDEX: 36.7 KG/M2 | SYSTOLIC BLOOD PRESSURE: 138 MMHG | TEMPERATURE: 98 F | DIASTOLIC BLOOD PRESSURE: 82 MMHG | RESPIRATION RATE: 16 BRPM | HEIGHT: 74 IN | HEART RATE: 69 BPM | WEIGHT: 286 LBS

## 2021-09-29 PROCEDURE — 99214 OFFICE O/P EST MOD 30 MIN: CPT

## 2021-09-30 LAB
ALBUMIN SERPL ELPH-MCNC: 4.4 G/DL
ALP BLD-CCNC: 296 U/L
ALT SERPL-CCNC: 48 U/L
ANION GAP SERPL CALC-SCNC: 9 MMOL/L
AST SERPL-CCNC: 50 U/L
BASOPHILS # BLD AUTO: 0.04 K/UL
BASOPHILS NFR BLD AUTO: 0.9 %
BILIRUB SERPL-MCNC: 0.5 MG/DL
BUN SERPL-MCNC: 16 MG/DL
CALCIUM SERPL-MCNC: 10.2 MG/DL
CHLORIDE SERPL-SCNC: 102 MMOL/L
CO2 SERPL-SCNC: 29 MMOL/L
CREAT SERPL-MCNC: 0.95 MG/DL
EOSINOPHIL # BLD AUTO: 0.23 K/UL
EOSINOPHIL NFR BLD AUTO: 5 %
HCT VFR BLD CALC: 45.9 %
HGB BLD-MCNC: 15.1 G/DL
IMM GRANULOCYTES NFR BLD AUTO: 0.2 %
LYMPHOCYTES # BLD AUTO: 1.23 K/UL
LYMPHOCYTES NFR BLD AUTO: 27 %
MAN DIFF?: NORMAL
MCHC RBC-ENTMCNC: 32.5 PG
MCHC RBC-ENTMCNC: 32.9 GM/DL
MCV RBC AUTO: 98.7 FL
MONOCYTES # BLD AUTO: 0.59 K/UL
MONOCYTES NFR BLD AUTO: 12.9 %
NEUTROPHILS # BLD AUTO: 2.46 K/UL
NEUTROPHILS NFR BLD AUTO: 54 %
PLATELET # BLD AUTO: 227 K/UL
POTASSIUM SERPL-SCNC: 4.4 MMOL/L
PROT SERPL-MCNC: 8 G/DL
RBC # BLD: 4.65 M/UL
RBC # FLD: 12.8 %
SODIUM SERPL-SCNC: 140 MMOL/L
WBC # FLD AUTO: 4.56 K/UL

## 2021-10-01 ENCOUNTER — NON-APPOINTMENT (OUTPATIENT)
Age: 41
End: 2021-10-01

## 2021-10-04 LAB — SURGICAL PATHOLOGY STUDY: SIGNIFICANT CHANGE UP

## 2021-10-13 ENCOUNTER — TRANSCRIPTION ENCOUNTER (OUTPATIENT)
Age: 41
End: 2021-10-13

## 2021-10-13 ENCOUNTER — NON-APPOINTMENT (OUTPATIENT)
Age: 41
End: 2021-10-13

## 2021-10-16 ENCOUNTER — NON-APPOINTMENT (OUTPATIENT)
Age: 41
End: 2021-10-16

## 2021-10-17 LAB
ALBUMIN SERPL ELPH-MCNC: 4.6 G/DL
ALP BLD-CCNC: 258 U/L
ALT SERPL-CCNC: 96 U/L
ANION GAP SERPL CALC-SCNC: 17 MMOL/L
AST SERPL-CCNC: 76 U/L
BILIRUB SERPL-MCNC: 0.7 MG/DL
BUN SERPL-MCNC: 17 MG/DL
CALCIUM SERPL-MCNC: 9.6 MG/DL
CHLORIDE SERPL-SCNC: 101 MMOL/L
CO2 SERPL-SCNC: 24 MMOL/L
CREAT SERPL-MCNC: 1.03 MG/DL
POTASSIUM SERPL-SCNC: 3.9 MMOL/L
PROT SERPL-MCNC: 8.2 G/DL
SODIUM SERPL-SCNC: 142 MMOL/L

## 2021-11-22 ENCOUNTER — NON-APPOINTMENT (OUTPATIENT)
Age: 41
End: 2021-11-22

## 2021-11-22 LAB
ALBUMIN SERPL ELPH-MCNC: 4.3 G/DL
ALP BLD-CCNC: 363 U/L
ALT SERPL-CCNC: 1466 U/L
ANION GAP SERPL CALC-SCNC: 11 MMOL/L
AST SERPL-CCNC: 751 U/L
BILIRUB SERPL-MCNC: 1.6 MG/DL
BUN SERPL-MCNC: 13 MG/DL
CALCIUM SERPL-MCNC: 9.9 MG/DL
CHLORIDE SERPL-SCNC: 102 MMOL/L
CO2 SERPL-SCNC: 26 MMOL/L
CREAT SERPL-MCNC: 1.05 MG/DL
POTASSIUM SERPL-SCNC: 4.9 MMOL/L
PROT SERPL-MCNC: 8.3 G/DL
SODIUM SERPL-SCNC: 139 MMOL/L

## 2021-11-23 LAB
ALBUMIN SERPL ELPH-MCNC: 4.1 G/DL
ALP BLD-CCNC: 357 U/L
ALT SERPL-CCNC: 1307 U/L
ANION GAP SERPL CALC-SCNC: 14 MMOL/L
AST SERPL-CCNC: 614 U/L
BILIRUB SERPL-MCNC: 1 MG/DL
BUN SERPL-MCNC: 10 MG/DL
CALCIUM SERPL-MCNC: 9.7 MG/DL
CHLORIDE SERPL-SCNC: 101 MMOL/L
CO2 SERPL-SCNC: 23 MMOL/L
CREAT SERPL-MCNC: 0.97 MG/DL
HAV IGM SER QL: NONREACTIVE
HBV CORE IGM SER QL: NONREACTIVE
HBV DNA # SERPL NAA+PROBE: NOT DETECTED IU/ML
HBV SURFACE AB SER QL: REACTIVE
HBV SURFACE AG SER QL: NONREACTIVE
HCV RNA SERPL NAA DL=5-ACNC: NOT DETECTED IU/ML
HCV RNA SERPL NAA+PROBE-LOG IU: NOT DETECTED LOG10IU/ML
HEPB DNA PCR LOG: NOT DETECTED LOG10IU/ML
INR PPP: 1 RATIO
POTASSIUM SERPL-SCNC: 4.9 MMOL/L
PROT SERPL-MCNC: 7.9 G/DL
PT BLD: 11.9 SEC
SODIUM SERPL-SCNC: 139 MMOL/L

## 2021-11-24 ENCOUNTER — RX RENEWAL (OUTPATIENT)
Age: 41
End: 2021-11-24

## 2021-11-25 LAB
HEV AB SER QL: NEGATIVE
SMOOTH MUSCLE AB SER QL IF: ABNORMAL

## 2021-11-27 LAB — HEPATITIS E IGM ABY: NOT DETECTED

## 2021-12-02 ENCOUNTER — NON-APPOINTMENT (OUTPATIENT)
Age: 41
End: 2021-12-02

## 2021-12-02 LAB
ALBUMIN SERPL ELPH-MCNC: 4.2 G/DL
ALP BLD-CCNC: 225 U/L
ALT SERPL-CCNC: 711 U/L
ANION GAP SERPL CALC-SCNC: 15 MMOL/L
AST SERPL-CCNC: 268 U/L
BILIRUB SERPL-MCNC: 0.7 MG/DL
BUN SERPL-MCNC: 19 MG/DL
CALCIUM SERPL-MCNC: 9.3 MG/DL
CHLORIDE SERPL-SCNC: 102 MMOL/L
CO2 SERPL-SCNC: 24 MMOL/L
CREAT SERPL-MCNC: 1.21 MG/DL
INR PPP: 0.92 RATIO
POTASSIUM SERPL-SCNC: 5.1 MMOL/L
PROT SERPL-MCNC: 7.8 G/DL
PT BLD: 11 SEC
SODIUM SERPL-SCNC: 141 MMOL/L

## 2021-12-10 ENCOUNTER — NON-APPOINTMENT (OUTPATIENT)
Age: 41
End: 2021-12-10

## 2021-12-10 LAB
ALBUMIN SERPL ELPH-MCNC: 3.8 G/DL
ALP BLD-CCNC: 198 U/L
ALT SERPL-CCNC: 524 U/L
ANION GAP SERPL CALC-SCNC: 14 MMOL/L
AST SERPL-CCNC: 228 U/L
BILIRUB SERPL-MCNC: 0.7 MG/DL
BUN SERPL-MCNC: 17 MG/DL
CALCIUM SERPL-MCNC: 9.1 MG/DL
CHLORIDE SERPL-SCNC: 104 MMOL/L
CO2 SERPL-SCNC: 23 MMOL/L
CREAT SERPL-MCNC: 0.88 MG/DL
POTASSIUM SERPL-SCNC: 4.9 MMOL/L
PROT SERPL-MCNC: 7.1 G/DL
SODIUM SERPL-SCNC: 141 MMOL/L

## 2022-01-04 ENCOUNTER — TRANSCRIPTION ENCOUNTER (OUTPATIENT)
Age: 42
End: 2022-01-04

## 2022-01-05 ENCOUNTER — NON-APPOINTMENT (OUTPATIENT)
Age: 42
End: 2022-01-05

## 2022-01-05 ENCOUNTER — APPOINTMENT (OUTPATIENT)
Dept: HEPATOLOGY | Facility: CLINIC | Age: 42
End: 2022-01-05
Payer: COMMERCIAL

## 2022-01-05 VITALS
DIASTOLIC BLOOD PRESSURE: 74 MMHG | HEART RATE: 70 BPM | SYSTOLIC BLOOD PRESSURE: 121 MMHG | RESPIRATION RATE: 16 BRPM | OXYGEN SATURATION: 97 % | TEMPERATURE: 97.2 F | HEIGHT: 74 IN | WEIGHT: 295 LBS | BODY MASS INDEX: 37.86 KG/M2

## 2022-01-05 LAB
ALBUMIN SERPL ELPH-MCNC: 4.1 G/DL
ALP BLD-CCNC: 213 U/L
ALT SERPL-CCNC: 450 U/L
AST SERPL-CCNC: 257 U/L
BILIRUB DIRECT SERPL-MCNC: 0.3 MG/DL
BILIRUB INDIRECT SERPL-MCNC: 0.2 MG/DL
BILIRUB SERPL-MCNC: 0.5 MG/DL
PROT SERPL-MCNC: 7.6 G/DL

## 2022-01-05 PROCEDURE — 99214 OFFICE O/P EST MOD 30 MIN: CPT

## 2022-01-05 RX ORDER — PREDNISONE 5 MG/1
5 TABLET ORAL
Qty: 105 | Refills: 0 | Status: DISCONTINUED | COMMUNITY
Start: 2021-11-23 | End: 2022-01-05

## 2022-01-11 LAB
ALBUMIN SERPL ELPH-MCNC: 4.3 G/DL
ALP BLD-CCNC: 236 U/L
ALT SERPL-CCNC: 677 U/L
ANION GAP SERPL CALC-SCNC: 9 MMOL/L
AST SERPL-CCNC: 418 U/L
BILIRUB SERPL-MCNC: 0.7 MG/DL
BUN SERPL-MCNC: 14 MG/DL
CALCIUM SERPL-MCNC: 9.2 MG/DL
CHLORIDE SERPL-SCNC: 104 MMOL/L
CO2 SERPL-SCNC: 27 MMOL/L
CREAT SERPL-MCNC: 1.01 MG/DL
POTASSIUM SERPL-SCNC: 4 MMOL/L
PROT SERPL-MCNC: 7.9 G/DL
SODIUM SERPL-SCNC: 140 MMOL/L

## 2022-01-20 ENCOUNTER — TRANSCRIPTION ENCOUNTER (OUTPATIENT)
Age: 42
End: 2022-01-20

## 2022-01-21 ENCOUNTER — TRANSCRIPTION ENCOUNTER (OUTPATIENT)
Age: 42
End: 2022-01-21

## 2022-01-27 ENCOUNTER — APPOINTMENT (OUTPATIENT)
Dept: HEPATOLOGY | Facility: CLINIC | Age: 42
End: 2022-01-27
Payer: COMMERCIAL

## 2022-01-27 VITALS
HEIGHT: 74 IN | BODY MASS INDEX: 38.24 KG/M2 | OXYGEN SATURATION: 97 % | WEIGHT: 298 LBS | DIASTOLIC BLOOD PRESSURE: 80 MMHG | SYSTOLIC BLOOD PRESSURE: 114 MMHG | HEART RATE: 76 BPM | TEMPERATURE: 97.2 F | RESPIRATION RATE: 16 BRPM

## 2022-01-27 PROCEDURE — 99214 OFFICE O/P EST MOD 30 MIN: CPT

## 2022-01-31 LAB
ALBUMIN SERPL ELPH-MCNC: 4 G/DL
ALP BLD-CCNC: 168 U/L
ALT SERPL-CCNC: 589 U/L
ANION GAP SERPL CALC-SCNC: 13 MMOL/L
AST SERPL-CCNC: 227 U/L
BILIRUB SERPL-MCNC: 0.8 MG/DL
BUN SERPL-MCNC: 16 MG/DL
CALCIUM SERPL-MCNC: 9.2 MG/DL
CHLORIDE SERPL-SCNC: 102 MMOL/L
CO2 SERPL-SCNC: 23 MMOL/L
CREAT SERPL-MCNC: 0.91 MG/DL
POTASSIUM SERPL-SCNC: 4.7 MMOL/L
PROT SERPL-MCNC: 7.5 G/DL
SODIUM SERPL-SCNC: 138 MMOL/L

## 2022-02-03 ENCOUNTER — TRANSCRIPTION ENCOUNTER (OUTPATIENT)
Age: 42
End: 2022-02-03

## 2022-02-03 ENCOUNTER — NON-APPOINTMENT (OUTPATIENT)
Age: 42
End: 2022-02-03

## 2022-02-03 LAB
ALBUMIN SERPL ELPH-MCNC: 4 G/DL
ALP BLD-CCNC: 144 U/L
ALT SERPL-CCNC: 592 U/L
ANION GAP SERPL CALC-SCNC: 12 MMOL/L
AST SERPL-CCNC: 241 U/L
BILIRUB SERPL-MCNC: 1 MG/DL
BUN SERPL-MCNC: 20 MG/DL
CALCIUM SERPL-MCNC: 9.4 MG/DL
CHLORIDE SERPL-SCNC: 103 MMOL/L
CO2 SERPL-SCNC: 24 MMOL/L
CREAT SERPL-MCNC: 0.95 MG/DL
POTASSIUM SERPL-SCNC: 4.6 MMOL/L
PROT SERPL-MCNC: 7.2 G/DL
SODIUM SERPL-SCNC: 139 MMOL/L

## 2022-02-10 ENCOUNTER — NON-APPOINTMENT (OUTPATIENT)
Age: 42
End: 2022-02-10

## 2022-02-10 LAB
ALBUMIN SERPL ELPH-MCNC: 4.2 G/DL
ALP BLD-CCNC: 150 U/L
ALT SERPL-CCNC: 480 U/L
AST SERPL-CCNC: 166 U/L
BILIRUB DIRECT SERPL-MCNC: 0.6 MG/DL
BILIRUB INDIRECT SERPL-MCNC: 0.6 MG/DL
BILIRUB SERPL-MCNC: 1.2 MG/DL
PROT SERPL-MCNC: 7.3 G/DL

## 2022-02-14 ENCOUNTER — TRANSCRIPTION ENCOUNTER (OUTPATIENT)
Age: 42
End: 2022-02-14

## 2022-02-17 ENCOUNTER — TRANSCRIPTION ENCOUNTER (OUTPATIENT)
Age: 42
End: 2022-02-17

## 2022-02-24 ENCOUNTER — NON-APPOINTMENT (OUTPATIENT)
Age: 42
End: 2022-02-24

## 2022-02-24 LAB
ALBUMIN SERPL ELPH-MCNC: 4 G/DL
ALP BLD-CCNC: 150 U/L
ALT SERPL-CCNC: 269 U/L
ANION GAP SERPL CALC-SCNC: 16 MMOL/L
AST SERPL-CCNC: 100 U/L
BILIRUB SERPL-MCNC: 0.7 MG/DL
BUN SERPL-MCNC: 18 MG/DL
CALCIUM SERPL-MCNC: 9.1 MG/DL
CHLORIDE SERPL-SCNC: 102 MMOL/L
CO2 SERPL-SCNC: 23 MMOL/L
CREAT SERPL-MCNC: 0.97 MG/DL
POTASSIUM SERPL-SCNC: 4.4 MMOL/L
PROT SERPL-MCNC: 6.9 G/DL
SODIUM SERPL-SCNC: 140 MMOL/L

## 2022-03-10 LAB
ALBUMIN SERPL ELPH-MCNC: 4.3 G/DL
ALP BLD-CCNC: 123 U/L
ALT SERPL-CCNC: 167 U/L
ANION GAP SERPL CALC-SCNC: 14 MMOL/L
AST SERPL-CCNC: 78 U/L
BASOPHILS # BLD AUTO: 0.03 K/UL
BASOPHILS NFR BLD AUTO: 0.4 %
BILIRUB SERPL-MCNC: 0.8 MG/DL
BUN SERPL-MCNC: 16 MG/DL
CALCIUM SERPL-MCNC: 9.3 MG/DL
CHLORIDE SERPL-SCNC: 103 MMOL/L
CO2 SERPL-SCNC: 24 MMOL/L
CREAT SERPL-MCNC: 0.96 MG/DL
EGFR: 102 ML/MIN/1.73M2
EOSINOPHIL # BLD AUTO: 0.02 K/UL
EOSINOPHIL NFR BLD AUTO: 0.3 %
HCT VFR BLD CALC: 44.3 %
HGB BLD-MCNC: 14.6 G/DL
IMM GRANULOCYTES NFR BLD AUTO: 0.4 %
LYMPHOCYTES # BLD AUTO: 1.35 K/UL
LYMPHOCYTES NFR BLD AUTO: 20.1 %
MAN DIFF?: NORMAL
MCHC RBC-ENTMCNC: 32.3 PG
MCHC RBC-ENTMCNC: 33 GM/DL
MCV RBC AUTO: 98 FL
MONOCYTES # BLD AUTO: 0.54 K/UL
MONOCYTES NFR BLD AUTO: 8 %
NEUTROPHILS # BLD AUTO: 4.74 K/UL
NEUTROPHILS NFR BLD AUTO: 70.8 %
PLATELET # BLD AUTO: 221 K/UL
POTASSIUM SERPL-SCNC: 5.1 MMOL/L
PROT SERPL-MCNC: 6.9 G/DL
RBC # BLD: 4.52 M/UL
RBC # FLD: 12.4 %
SODIUM SERPL-SCNC: 140 MMOL/L
WBC # FLD AUTO: 6.71 K/UL

## 2022-03-15 ENCOUNTER — TRANSCRIPTION ENCOUNTER (OUTPATIENT)
Age: 42
End: 2022-03-15

## 2022-04-01 ENCOUNTER — TRANSCRIPTION ENCOUNTER (OUTPATIENT)
Age: 42
End: 2022-04-01

## 2022-04-01 LAB
ALBUMIN SERPL ELPH-MCNC: 4.4 G/DL
ALP BLD-CCNC: 115 U/L
ALT SERPL-CCNC: 127 U/L
AST SERPL-CCNC: 65 U/L
BILIRUB DIRECT SERPL-MCNC: 0.2 MG/DL
BILIRUB INDIRECT SERPL-MCNC: 0.3 MG/DL
BILIRUB SERPL-MCNC: 0.6 MG/DL
PROT SERPL-MCNC: 7.1 G/DL

## 2022-05-12 ENCOUNTER — NON-APPOINTMENT (OUTPATIENT)
Age: 42
End: 2022-05-12

## 2022-05-12 LAB
ALBUMIN SERPL ELPH-MCNC: 4 G/DL
ALP BLD-CCNC: 132 U/L
ALT SERPL-CCNC: 195 U/L
AST SERPL-CCNC: 86 U/L
BILIRUB DIRECT SERPL-MCNC: 0.3 MG/DL
BILIRUB INDIRECT SERPL-MCNC: 0.3 MG/DL
BILIRUB SERPL-MCNC: 0.6 MG/DL
PROT SERPL-MCNC: 6.8 G/DL

## 2022-05-13 ENCOUNTER — TRANSCRIPTION ENCOUNTER (OUTPATIENT)
Age: 42
End: 2022-05-13

## 2022-05-14 ENCOUNTER — TRANSCRIPTION ENCOUNTER (OUTPATIENT)
Age: 42
End: 2022-05-14

## 2022-05-16 ENCOUNTER — TRANSCRIPTION ENCOUNTER (OUTPATIENT)
Age: 42
End: 2022-05-16

## 2022-05-17 ENCOUNTER — TRANSCRIPTION ENCOUNTER (OUTPATIENT)
Age: 42
End: 2022-05-17

## 2022-05-17 ENCOUNTER — APPOINTMENT (OUTPATIENT)
Dept: AFTER HOURS CARE | Facility: EMERGENCY ROOM | Age: 42
End: 2022-05-17
Payer: COMMERCIAL

## 2022-05-17 DIAGNOSIS — U07.1 COVID-19: ICD-10-CM

## 2022-05-17 PROCEDURE — 99213 OFFICE O/P EST LOW 20 MIN: CPT | Mod: 95

## 2022-05-17 NOTE — PHYSICAL EXAM
[No Acute Distress] : no acute distress [Well Nourished] : well nourished [Well Developed] : well developed [Well-Appearing] : well-appearing [Normal Sclera/Conjunctiva] : normal sclera/conjunctiva [Normal Outer Ear/Nose] : the outer ears and nose were normal in appearance [No Respiratory Distress] : no respiratory distress  [No Accessory Muscle Use] : no accessory muscle use [No Rash] : no rash [Coordination Grossly Intact] : coordination grossly intact [No Focal Deficits] : no focal deficits [Normal Affect] : the affect was normal [Normal Insight/Judgement] : insight and judgment were intact

## 2022-05-17 NOTE — ASSESSMENT
[FreeTextEntry1] : MAB questionnaire completed  Pt does not require any other treatment at this time based on s/s

## 2022-05-17 NOTE — REVIEW OF SYSTEMS
[Sore Throat] : sore throat [Dyspnea on Exertion] : dyspnea on exertion [Nausea] : nausea [Vomiting] : vomiting [Headache] : headache [Fever] : no fever [Vision Problems] : no vision problems [Nasal Discharge] : no nasal discharge [Chest Pain] : no chest pain [Shortness Of Breath] : no shortness of breath [Wheezing] : no wheezing [Cough] : no cough [Abdominal Pain] : no abdominal pain [Diarrhea] : no diarrhea [Back Pain] : no back pain [Skin Rash] : no skin rash

## 2022-05-17 NOTE — HISTORY OF PRESENT ILLNESS
[Home] : at home, [unfilled] , at the time of the visit. [Other Location: e.g. Home (Enter Location, City,State)___] : at [unfilled] [Verbal consent obtained from patient] : the patient, [unfilled] [FreeTextEntry8] : 40 yo M with hx of autoimmune hepatitis and obesity on Cellcept and Prednisone COVID+ today looking for MAB infusion.  + cough, malaise, h/a and vomiting.  No SOB or CP

## 2022-05-21 ENCOUNTER — APPOINTMENT (OUTPATIENT)
Dept: DISASTER EMERGENCY | Facility: HOSPITAL | Age: 42
End: 2022-05-21

## 2022-05-21 ENCOUNTER — OUTPATIENT (OUTPATIENT)
Dept: OUTPATIENT SERVICES | Facility: HOSPITAL | Age: 42
LOS: 1 days | End: 2022-05-21
Payer: COMMERCIAL

## 2022-05-21 VITALS
OXYGEN SATURATION: 95 % | DIASTOLIC BLOOD PRESSURE: 90 MMHG | HEART RATE: 78 BPM | SYSTOLIC BLOOD PRESSURE: 123 MMHG | RESPIRATION RATE: 20 BRPM

## 2022-05-21 VITALS
HEART RATE: 76 BPM | TEMPERATURE: 98 F | RESPIRATION RATE: 18 BRPM | SYSTOLIC BLOOD PRESSURE: 140 MMHG | OXYGEN SATURATION: 96 % | DIASTOLIC BLOOD PRESSURE: 99 MMHG

## 2022-05-21 DIAGNOSIS — U07.1 COVID-19: ICD-10-CM

## 2022-05-21 PROCEDURE — M0222: CPT

## 2022-05-21 RX ORDER — BEBTELOVIMAB 87.5 MG/ML
175 INJECTION, SOLUTION INTRAVENOUS ONCE
Refills: 0 | Status: COMPLETED | OUTPATIENT
Start: 2022-05-21 | End: 2022-05-21

## 2022-05-21 RX ADMIN — BEBTELOVIMAB 175 MILLIGRAM(S): 87.5 INJECTION, SOLUTION INTRAVENOUS at 07:46

## 2022-05-21 NOTE — MONOCLONAL ANTIBODY INFUSION - HOME MEDICATIONS
ursodiol 500 mg oral tablet , 1 tab(s) orally 2 times a day (with meals)  aspirin 325 mg oral delayed release tablet , 1 tab(s) orally once a day  flecainide 100 mg oral tablet , 1 tab(s) orally 2 times a day  predniSONE 10 mg oral tablet , 1 tab(s) orally once a day  Imuran , 75 milligram(s) orally once a day  Toprol-XL 50 mg oral tablet, extended release , 1 tab(s) orally once a day

## 2022-05-21 NOTE — MONOCLONAL ANTIBODY INFUSION - EXAM
Exam/findings:  T: 98.0  HR: 81  BP: 140/99  RR: 16  SpO2: 96%     PE:   Appearance: NAD	  HEENT:  NC/AT  Cardiovascular:  No edema  Respiratory: no use of accessory muscles  Gastrointestinal:  non-distended   Skin: warm and dry  Neurologic: Non-focal  Extremities: Normal range of motion

## 2022-05-21 NOTE — MONOCLONAL ANTIBODY INFUSION - ASSESSMENT AND PLAN
CC: Monoclonal Antibody Infusion/COVID 19 Positive  41y Male with hx of HTN, autoimmune hepatitis, and recent dx of COVID 19+ who presents today for elective Bebtelovimab. Patient has been screened and was deemed to be a candidate.    Symptoms/ Criteria  Date of Symptom Onset: 5/17/22  Symptoms: bodyaches, cough, congestion, night sweats, nausea, vomiting, diarrhea, chills   Date of Positive COVID PCR: 5/17/22  Risk Profile includes:  immunosuppressant therapy     Vaccination Status: J&J booster 1/2022    PMHx:  Infection due to severe acute respiratory syndrome coronavirus 2 (SARS-CoV-2)      ASSESSMENT:  Pt is COVID positive and symptomatic who was referred for Bebtelovimab monoclonal antibody treatment.    PLAN:  - MAB treatment explained to patient. I have reviewed the Bebtelovimab Emergency Use Authorization (EUA).   - Consent for MAB obtained.   - Risk & benefits discussed. Patient verbalized understanding of plan and agrees to treatment. All questions answered.  - 175mg of Bebtelovimab administered as a single intravenous injection over at least 30 seconds.   - Observe patient for one hour post medication administration and then if stable, discharge home with outpatient follow up as planned by PCP.    POST ASSESSMENT:   Patient completed MAB, and monitored x 1 hour post-infusion with no adverse reactions noted, remained hemodynamically stable.  - Patient tolerated injection well; denies complaints of chest pain/SOB/dizziness/palpitations.   - VSS for discharge home.  - D/C instructions given/ fact sheet included.  - Patient was instructed to self-isolate and use infection control measures (e.g wear mask, isolate, social distance, avoid sharing personal items, clean and disinfect "high touch" surfaces, and frequent handwashing according to the CDC guidelines.   - The patient was informed on what symptoms to be aware of for the next couple of days, and if there are any issues to call the 24/7 clinical call center. Patient was instructed to follow up with primary care provider as needed.    DISCHARGE at 8:40AM.

## 2022-06-03 LAB
ALBUMIN SERPL ELPH-MCNC: 4.2 G/DL
ALP BLD-CCNC: 118 U/L
ALT SERPL-CCNC: 149 U/L
ANION GAP SERPL CALC-SCNC: 10 MMOL/L
AST SERPL-CCNC: 73 U/L
BILIRUB SERPL-MCNC: 0.4 MG/DL
BUN SERPL-MCNC: 13 MG/DL
CALCIUM SERPL-MCNC: 9.4 MG/DL
CHLORIDE SERPL-SCNC: 107 MMOL/L
CO2 SERPL-SCNC: 26 MMOL/L
CREAT SERPL-MCNC: 1.08 MG/DL
EGFR: 89 ML/MIN/1.73M2
POTASSIUM SERPL-SCNC: 4.2 MMOL/L
PROT SERPL-MCNC: 6.9 G/DL
SODIUM SERPL-SCNC: 143 MMOL/L

## 2022-06-06 ENCOUNTER — TRANSCRIPTION ENCOUNTER (OUTPATIENT)
Age: 42
End: 2022-06-06

## 2022-07-05 NOTE — PROGRESS NOTE ADULT - SUBJECTIVE AND OBJECTIVE BOX
IR Post Procedure Note    Diagnosis: Autoimmune Hepatitis    Procedure: Liver Biopsy    : Darnell Esparza MD    Contrast: None    Anesthesia: 1% Lidocaine Subcutaneous    Estimated Blood Loss: Less than 10cc    Specimens: Specimens identified, labeled, confirmed and sent to lab.    Complications: No Immediate Complications    Anticoagulation: Resume in 48 Hours    Findings & Plan: Single 18g core bx of liver obtained w Biopince biopsy needle under US guidance. No hematoma or complications on post procedure US.      Please call Interventional Radiology with any questions, concerns, or issues. 
(1) Outpatient Area

## 2022-07-26 ENCOUNTER — RX RENEWAL (OUTPATIENT)
Age: 42
End: 2022-07-26

## 2022-08-01 ENCOUNTER — TRANSCRIPTION ENCOUNTER (OUTPATIENT)
Age: 42
End: 2022-08-01

## 2022-08-02 ENCOUNTER — TRANSCRIPTION ENCOUNTER (OUTPATIENT)
Age: 42
End: 2022-08-02

## 2022-08-03 ENCOUNTER — TRANSCRIPTION ENCOUNTER (OUTPATIENT)
Age: 42
End: 2022-08-03

## 2022-08-04 ENCOUNTER — TRANSCRIPTION ENCOUNTER (OUTPATIENT)
Age: 42
End: 2022-08-04

## 2022-08-25 LAB
ALBUMIN SERPL ELPH-MCNC: 4.3 G/DL
ALP BLD-CCNC: 153 U/L
ALT SERPL-CCNC: 159 U/L
ANA PAT FLD IF-IMP: ABNORMAL
ANA SER IF-ACNC: ABNORMAL
ANION GAP SERPL CALC-SCNC: 14 MMOL/L
AST SERPL-CCNC: 100 U/L
BASOPHILS # BLD AUTO: 0.03 K/UL
BASOPHILS NFR BLD AUTO: 0.6 %
BILIRUB SERPL-MCNC: 1 MG/DL
BUN SERPL-MCNC: 14 MG/DL
CALCIUM SERPL-MCNC: 9.1 MG/DL
CHLORIDE SERPL-SCNC: 107 MMOL/L
CHOLEST SERPL-MCNC: 186 MG/DL
CO2 SERPL-SCNC: 21 MMOL/L
CREAT SERPL-MCNC: 1.03 MG/DL
DEPRECATED KAPPA LC FREE/LAMBDA SER: 1.35 RATIO
EGFR: 94 ML/MIN/1.73M2
EOSINOPHIL # BLD AUTO: 0.12 K/UL
EOSINOPHIL NFR BLD AUTO: 2.2 %
ESTIMATED AVERAGE GLUCOSE: 108 MG/DL
GGT SERPL-CCNC: 231 U/L
HBA1C MFR BLD HPLC: 5.4 %
HCT VFR BLD CALC: 47.3 %
HDLC SERPL-MCNC: 71 MG/DL
HGB BLD-MCNC: 15.7 G/DL
IGA SER QL IEP: 214 MG/DL
IGG SER QL IEP: 1443 MG/DL
IGM SER QL IEP: 88 MG/DL
IMM GRANULOCYTES NFR BLD AUTO: 0.2 %
INR PPP: 0.97 RATIO
KAPPA LC CSF-MCNC: 1.81 MG/DL
KAPPA LC SERPL-MCNC: 2.45 MG/DL
LDLC SERPL CALC-MCNC: 87 MG/DL
LYMPHOCYTES # BLD AUTO: 1.36 K/UL
LYMPHOCYTES NFR BLD AUTO: 25.1 %
MAN DIFF?: NORMAL
MCHC RBC-ENTMCNC: 32.4 PG
MCHC RBC-ENTMCNC: 33.2 GM/DL
MCV RBC AUTO: 97.7 FL
MONOCYTES # BLD AUTO: 0.78 K/UL
MONOCYTES NFR BLD AUTO: 14.4 %
NEUTROPHILS # BLD AUTO: 3.12 K/UL
NEUTROPHILS NFR BLD AUTO: 57.5 %
NONHDLC SERPL-MCNC: 115 MG/DL
PLATELET # BLD AUTO: 194 K/UL
POTASSIUM SERPL-SCNC: 4 MMOL/L
PROT SERPL-MCNC: 7.3 G/DL
PT BLD: 11.3 SEC
RBC # BLD: 4.84 M/UL
RBC # FLD: 12.6 %
SODIUM SERPL-SCNC: 142 MMOL/L
TRIGL SERPL-MCNC: 138 MG/DL
WBC # FLD AUTO: 5.42 K/UL

## 2022-08-26 LAB
MITOCHONDRIA AB SER IF-ACNC: NORMAL
SMOOTH MUSCLE AB SER QL IF: NORMAL

## 2022-09-09 ENCOUNTER — APPOINTMENT (OUTPATIENT)
Dept: HEPATOLOGY | Facility: CLINIC | Age: 42
End: 2022-09-09

## 2022-09-09 VITALS
HEART RATE: 84 BPM | SYSTOLIC BLOOD PRESSURE: 129 MMHG | RESPIRATION RATE: 16 BRPM | OXYGEN SATURATION: 96 % | BODY MASS INDEX: 39.78 KG/M2 | DIASTOLIC BLOOD PRESSURE: 88 MMHG | HEIGHT: 74 IN | TEMPERATURE: 97.6 F | WEIGHT: 310 LBS

## 2022-09-09 PROCEDURE — 99215 OFFICE O/P EST HI 40 MIN: CPT

## 2022-09-12 NOTE — PHYSICAL EXAM
[Non-Tender] : non-tender [Cognitive Mini-Mental Status Normal?] : Cognitive Mini Mental Status Exam is normal [General Appearance - Alert] : alert [General Appearance - In No Acute Distress] : in no acute distress [Sclera] : the sclera and conjunctiva were normal [Outer Ear] : the ears and nose were normal in appearance [Neck Appearance] : the appearance of the neck was normal [Neck Cervical Mass (___cm)] : no neck mass was observed [Jugular Venous Distention Increased] : there was no jugular-venous distention [Thyroid Diffuse Enlargement] : the thyroid was not enlarged [Thyroid Nodule] : there were no palpable thyroid nodules [Auscultation Breath Sounds / Voice Sounds] : lungs were clear to auscultation bilaterally [Heart Rate And Rhythm] : heart rate was normal and rhythm regular [Heart Sounds] : normal S1 and S2 [Heart Sounds Gallop] : no gallops [Murmurs] : no murmurs [Heart Sounds Pericardial Friction Rub] : no pericardial rub [Edema] : there was no peripheral edema [Bowel Sounds] : normal bowel sounds [Abdomen Soft] : soft [Abdomen Tenderness] : non-tender [Abdomen Mass (___ Cm)] : no abdominal mass palpated [No CVA Tenderness] : no ~M costovertebral angle tenderness [No Spinal Tenderness] : no spinal tenderness [Abnormal Walk] : normal gait [Nail Clubbing] : no clubbing  or cyanosis of the fingernails [Musculoskeletal - Swelling] : no joint swelling seen [Motor Tone] : muscle strength and tone were normal [Skin Color & Pigmentation] : normal skin color and pigmentation [Skin Turgor] : normal skin turgor [] : no rash [Deep Tendon Reflexes (DTR)] : deep tendon reflexes were 2+ and symmetric [Sensation] : the sensory exam was normal to light touch and pinprick [No Focal Deficits] : no focal deficits [Oriented To Time, Place, And Person] : oriented to person, place, and time [Impaired Insight] : insight and judgment were intact [Affect] : the affect was normal [Scleral Icterus] : No Scleral Icterus [Hepatojugular Reflux] : patient did not have a sustained hepatojugular reflux [Spider Angioma] : No spider angioma(s) were observed [Abdominal Bruit] : no abdominal bruit [Splenomegaly] : no splenomegaly [Asterixis] : no asterixis observed [Jaundice] : No jaundice [Palmar Erythema] : no Palmar Erythema [Depression] : no depression [Cervical Lymph Nodes Enlarged Posterior Bilaterally] : posterior cervical [Supraclavicular Lymph Nodes Enlarged Bilaterally] : supraclavicular

## 2022-09-12 NOTE — ASSESSMENT
[FreeTextEntry1] : Mr. CAROL CORDOBA is 42 year old male who is being seen for follow up visit with flare up of autoimmune disease. He is currently on tapered off  Prednisone to 10mg /day, Cellcept 500 mg 2 tabs Am and 1 tab PM. \par (Last seen by Dr. DEL VALLE on 01/27/2022)\par \par # Elevated Liver Enzymes - Chronic elevated AST//159 trending down unsteadily since 01/10/22 418/677. MRE ordered with MRCP to evaluate for the Fibrosis/Steatosis and evaluated the biliary tree for any structural etiology of elevated liver enzymes.\par >> Elevated  with normalized twice in 06/01/22 and 03/31/22, Tbil 1.0 stayed normal since 01/10/2022. \par >> Fibrosis - MRE 8/28/19 showed stage 1-2 fibrosis with mild steatosis c/w FS in 2016 scored F4 and F0 in 2017, not a sensitive FS scores with increased BM > 35.\par \par # Autoimmune hepatitis- Labs on 08/24/2022 shows first time WDL- IGG and ASMA since 03/12/2014, AMA, Homogenous CHARLIE 1:320 > 1:160 speckled (07/26/2018) He is currently on tapered off Prednisone to 10mg /day, Cellcept 500 mg 2 tabs Am and 1 tab PM. Advised to c/w same regimen although our goal is to taper off the prednisone with another stable AI panel as ordered.\par \par PH/o on Ursodiol 500 mg 2tabs BID since 08/01/2016. AIH treated with prednisone mickey to stop in mid-December 2021.  Restarted at 40 mg (01/11/2022), He has been on azathioprine 02/19/2019, switched to Cellcept on 02/03/2022 1 Tab BID (For the concerns of fertility S/E) He had a sinus infection and took Augmentin for 10 days stopping Sunday, 01/02/2022.  Was on prednisone 40 mg a day for approximately 3 weeks.\par \par >> I have reviewed the side effects of prednisone with the patient. I have reviewed the risks of long term treatment. I have explained the risks of developing acne, moon-shaped facies, abdominal striae, a dorsal hump, weight gain, diabetes, cataracts, hypertension, and ischemic necrosis of the hips. I have explained that there may be other side effects as well related to prednisone treatment.\par \par # Metabolic Syndrome - Reviewed the A1C and Lipids from labs done on 09/09/2022 with obese class 2 BMI of 39.8. Reviewed the spectrum of disease, the risk of disease progression with added risk factors commonly seen in patients with diabetes or those who are overweight or vice versa, a precursor to the development of diabetes as it is a component of the metabolic syndrome. Advised to F/U with PCP/Cardiology for treatment regimen. Labs on 08/24/2022 shows WDL- A1C, Lipids.\par \par PLAN to F/U in 2 months RPA with monthly call back to discuss the medication regimen till the liver enzymes are normalized.\par Encouraged to call back in the interim with any issues or concerns so that we can address and assist as required.

## 2022-09-12 NOTE — HISTORY OF PRESENT ILLNESS
[de-identified] : Mr. CAROL CORDOBA is 42 year old male who is being seen for follow up visit with \par \par  Carol comes in today for follow-up.  He has had a flare of autoimmune disease and was treated with prednisone taper stopping in mid December.  He has been off azathioprine for about 2 months.  He had a sinus infection and took Augmentin for 10 days stopping Sunday, January 2, 2022.  He has no complaints at this time.  He is currently taking prednisone 40 mg a day for approximately 3 weeks\par \par Blood test January 26, 2022 , , total bilirubin 0.8.  January 10, 2022 , , total bilirubin 0.7, alkaline phosphatase 236\par \par Blood test January 4, 2022 , , alkaline phosphatase 213.  December 10, 2021 , , alkaline phosphatase 198\par \par Blood test August 23, 2021 alkaline phosphatase 266, ALT 55, AST 50, total bilirubin 0.6, creatinine 0.96\par \par Blood test May 1, 2020 alkaline phosphatase 198, ALT 48, AST 49, total bilirubin 0.6\par \par Blood tests December 18, 2019 alkaline phosphatase 204, ALT 38, AST 36\par \par Blood tests on 2/7/19  ,  and , IGG 2241. He was started on prednisone 30 mg daily with a rapid taper reducing by 5 mg every 5 days  and completed in April. His blood tests at the end of taper in April showed normalization of ALT and AST with significant reduction of ALP but by June blood test showed rising ALT 52, AST 50, . His Azathioprine was increased to 100 mg daily and he was continued with prednisone 2 mg a day and Ursodiol 2 g a day. His blood test from 9/18/19 showed ALT 63, AST 56, . \par \par Liver biopsy from February 4, 2014 shows chronic hepatitis and portal fibrosis no evidence of bridging fibrosis or cirrhosis. It was nondiagnostic for autoimmune disease and there was some biliary ductal abnormalities. MRCP did not showed evidence of PSC. He reports being told that he have fatty liver on imaging in the past. \par \par Blood tests on 2/7/19 showed a rise in liver markers, ,  and , IGG 2241. He has not started new supplements/herbal products or medications. No alcohol use. No recent illness. His complete liver workup from July 2018  was negative for viral hepatitis and PBC, ASMA was 1:40, CHARLIE 1:160 and IGG 1881.  MRI from Aug 2018 did not showed evidence of PSC. \par \par Fibroscan test done 4/5/16 showed F4. \par Fibroscan test performed 9/13/17 showed F0, steatosis S0.\par MRE from 8/13/18 showed stage 1 to 2 fibrosis, no significant steatosis. \par MRE 8/28/19 showed stage 1-2 fibrosis with mild steatosis. \par \par Abdo MRI 8/13/18 showed no hepatic masses, bile ducts without choledocholithiasis or stricture, elastrography showed no fibrosis. \par \par US from 7/21/18 no hepatic lesion, common bile duct and pancreas not well visualized due to excessive overlying bowel gas.\par \par Abdominal ultrasound 3/8/17 showed no hepatic lesions. \par \par Abdominal sonogram from December 2, 2015 shows diffuse fatty infiltration and splenomegaly.\par \par

## 2022-09-20 ENCOUNTER — RX RENEWAL (OUTPATIENT)
Age: 42
End: 2022-09-20

## 2022-09-20 ENCOUNTER — TRANSCRIPTION ENCOUNTER (OUTPATIENT)
Age: 42
End: 2022-09-20

## 2022-09-22 ENCOUNTER — TRANSCRIPTION ENCOUNTER (OUTPATIENT)
Age: 42
End: 2022-09-22

## 2022-10-03 ENCOUNTER — TRANSCRIPTION ENCOUNTER (OUTPATIENT)
Age: 42
End: 2022-10-03

## 2022-10-19 ENCOUNTER — APPOINTMENT (OUTPATIENT)
Dept: MRI IMAGING | Facility: CLINIC | Age: 42
End: 2022-10-19

## 2022-10-19 ENCOUNTER — APPOINTMENT (OUTPATIENT)
Dept: RADIOLOGY | Facility: CLINIC | Age: 42
End: 2022-10-19

## 2022-10-21 ENCOUNTER — TRANSCRIPTION ENCOUNTER (OUTPATIENT)
Age: 42
End: 2022-10-21

## 2022-10-24 ENCOUNTER — TRANSCRIPTION ENCOUNTER (OUTPATIENT)
Age: 42
End: 2022-10-24

## 2022-11-28 ENCOUNTER — RX RENEWAL (OUTPATIENT)
Age: 42
End: 2022-11-28

## 2022-12-09 LAB
AFP-TM SERPL-MCNC: 4.1 NG/ML
BASOPHILS # BLD AUTO: 0.03 K/UL
BASOPHILS NFR BLD AUTO: 0.5 %
EOSINOPHIL # BLD AUTO: 0.11 K/UL
EOSINOPHIL NFR BLD AUTO: 1.8 %
HCT VFR BLD CALC: 43.8 %
HGB BLD-MCNC: 14.3 G/DL
IMM GRANULOCYTES NFR BLD AUTO: 0.5 %
LYMPHOCYTES # BLD AUTO: 1.56 K/UL
LYMPHOCYTES NFR BLD AUTO: 25.2 %
MAN DIFF?: NORMAL
MCHC RBC-ENTMCNC: 30.8 PG
MCHC RBC-ENTMCNC: 32.6 GM/DL
MCV RBC AUTO: 94.4 FL
MONOCYTES # BLD AUTO: 0.77 K/UL
MONOCYTES NFR BLD AUTO: 12.4 %
NEUTROPHILS # BLD AUTO: 3.7 K/UL
NEUTROPHILS NFR BLD AUTO: 59.6 %
PLATELET # BLD AUTO: 224 K/UL
RBC # BLD: 4.64 M/UL
RBC # FLD: 12.4 %
WBC # FLD AUTO: 6.2 K/UL

## 2022-12-12 LAB
25(OH)D3 SERPL-MCNC: 37.9 NG/ML
ALBUMIN SERPL ELPH-MCNC: 4.1 G/DL
ALP BLD-CCNC: 197 U/L
ALT SERPL-CCNC: 73 U/L
ANION GAP SERPL CALC-SCNC: 13 MMOL/L
AST SERPL-CCNC: 48 U/L
BILIRUB SERPL-MCNC: 0.8 MG/DL
BUN SERPL-MCNC: 10 MG/DL
CALCIUM SERPL-MCNC: 9.1 MG/DL
CHLORIDE SERPL-SCNC: 104 MMOL/L
CHOLEST SERPL-MCNC: 193 MG/DL
CO2 SERPL-SCNC: 23 MMOL/L
CREAT SERPL-MCNC: 0.89 MG/DL
DEPRECATED KAPPA LC FREE/LAMBDA SER: 1.41 RATIO
EGFR: 110 ML/MIN/1.73M2
ESTIMATED AVERAGE GLUCOSE: 111 MG/DL
FOLATE SERPL-MCNC: 11.4 NG/ML
GGT SERPL-CCNC: 179 U/L
HBA1C MFR BLD HPLC: 5.5 %
HDLC SERPL-MCNC: 63 MG/DL
IGA SER QL IEP: 226 MG/DL
IGG SER QL IEP: 1784 MG/DL
IGM SER QL IEP: 72 MG/DL
INR PPP: 0.97 RATIO
KAPPA LC CSF-MCNC: 2.29 MG/DL
KAPPA LC SERPL-MCNC: 3.23 MG/DL
LDLC SERPL CALC-MCNC: 105 MG/DL
MITOCHONDRIA AB SER IF-ACNC: NORMAL
NONHDLC SERPL-MCNC: 130 MG/DL
POTASSIUM SERPL-SCNC: 4.1 MMOL/L
PROT SERPL-MCNC: 7.5 G/DL
PT BLD: 11.2 SEC
SMOOTH MUSCLE AB SER QL IF: ABNORMAL
SODIUM SERPL-SCNC: 140 MMOL/L
TRIGL SERPL-MCNC: 125 MG/DL
TSH SERPL-ACNC: 2.03 UIU/ML
VIT B12 SERPL-MCNC: 258 PG/ML

## 2022-12-13 ENCOUNTER — TRANSCRIPTION ENCOUNTER (OUTPATIENT)
Age: 42
End: 2022-12-13

## 2022-12-14 LAB
ANA PAT FLD IF-IMP: ABNORMAL
ANA SER IF-ACNC: ABNORMAL

## 2022-12-16 ENCOUNTER — RX RENEWAL (OUTPATIENT)
Age: 42
End: 2022-12-16

## 2022-12-19 ENCOUNTER — NON-APPOINTMENT (OUTPATIENT)
Age: 42
End: 2022-12-19

## 2022-12-19 ENCOUNTER — TRANSCRIPTION ENCOUNTER (OUTPATIENT)
Age: 42
End: 2022-12-19

## 2022-12-19 LAB — PHOSPHATIDYETHANOL (PETH), WHOLE BLOOD: NEGATIVE NG/ML

## 2022-12-20 ENCOUNTER — APPOINTMENT (OUTPATIENT)
Dept: HEPATOLOGY | Facility: CLINIC | Age: 42
End: 2022-12-20

## 2022-12-20 VITALS
TEMPERATURE: 97.2 F | HEART RATE: 91 BPM | OXYGEN SATURATION: 97 % | WEIGHT: 305 LBS | RESPIRATION RATE: 16 BRPM | DIASTOLIC BLOOD PRESSURE: 87 MMHG | BODY MASS INDEX: 39.14 KG/M2 | SYSTOLIC BLOOD PRESSURE: 127 MMHG | HEIGHT: 74 IN

## 2022-12-20 DIAGNOSIS — R74.9 ABNORMAL SERUM ENZYME LEVEL, UNSPECIFIED: ICD-10-CM

## 2022-12-20 PROCEDURE — 99215 OFFICE O/P EST HI 40 MIN: CPT

## 2022-12-20 RX ORDER — ASPIRIN 81 MG
81 TABLET, DELAYED RELEASE (ENTERIC COATED) ORAL
Refills: 0 | Status: DISCONTINUED | COMMUNITY
End: 2022-12-20

## 2022-12-20 RX ORDER — FAMOTIDINE 20 MG/1
20 TABLET, FILM COATED ORAL TWICE DAILY
Qty: 180 | Refills: 3 | Status: ACTIVE | COMMUNITY
Start: 2022-12-20 | End: 1900-01-01

## 2022-12-20 RX ORDER — FAMOTIDINE 40 MG/1
TABLET, FILM COATED ORAL
Refills: 0 | Status: DISCONTINUED | COMMUNITY
End: 2022-12-20

## 2022-12-21 NOTE — HISTORY OF PRESENT ILLNESS
[FreeTextEntry1] : Mr. CAROL CORDOBA is 42 year old male who is being seen for follow up visit with autoimmune disease on Ursodiol 2 gm/day, Prednisone 10mg/day, Cellcept 500 mg 2am/1pm BID and He has no complaints at this time.  \par \par PH/o was treated with prednisone taper stopping in mid-December 2021. Was off azathioprine (Oct to Nov 2021). He had a sinus infection and took Augmentin for 10 days stopping Sunday, January 2, 2022.  He was taking prednisone 40 mg a day for approximately 3 weeks. Switched from Azathioprine 50 mg to Cellcept 500 mg BID in 02/2022 by Dr. Manzano.\par \par MRE 12/09/2022: 3.5 kPa, F1-2, Mild Hepatic steatosis FF 6.3%\par MRE 8/28/19 showed stage 1-2 fibrosis with mild steatosis. \par MRE from 8/13/18 showed stage 1 to 2 fibrosis, no significant steatosis. \par Fibroscan test performed 9/13/17 showed F0, steatosis S0.\par Fibroscan test done 4/5/16 showed F4. \par Liver biopsy from February 4, 2014 shows chronic hepatitis and portal fibrosis no evidence of bridging fibrosis or cirrhosis. It was non diagnostic for autoimmune disease and there was some biliary ductal abnormalities. MRCP did not showed evidence of PSC. Reports being told that he have fatty liver on imaging in the past. \par \par 12/09/2022 Labs: Elevated AST/ALT 48/73, , IGG > 1784, GGT < 179, Homogeneous CHARLIE 1:320, ASMA 1:20, WDL-Tbil 0.8, AFP, Vit B12/Folate/D, CBC, INR, TSH/T4, PEth, AMA, A1C. MRI: Patchy areas of restricted diffusion in the liver possibly representing areas of inflammation. Noncirrhotic morphology, No iron overload. \par \par Blood test January 26, 2022 , , total bilirubin 0.8.  January 10, 2022 , , total bilirubin 0.7, alkaline phosphatase 236\par Blood test January 4, 2022 , , alkaline phosphatase 213.  December 10, 2021 , , alkaline phosphatase 198\par \par Blood test August 23, 2021 alkaline phosphatase 266, ALT 55, AST 50, total bilirubin 0.6, creatinine 0.96\par Blood test May 1, 2020 alkaline phosphatase 198, ALT 48, AST 49, total bilirubin 0.6\par \par Blood tests December 18, 2019 alkaline phosphatase 204, ALT 38, AST 36.\par On 9/18/19 showed ALT 63, AST 56, and . \par June/2019 blood test showed rising ALT 52, AST 50 and . His Azathioprine was increased to 100 mg daily and he was continued with prednisone 2 mg a day and Ursodiol 2 gm a day. \par On 2/7/19 showed a rise in liver markers, ,  and , IGG 2241. \par He was started on prednisone 30 mg daily with a rapid taper reducing by 5 mg every 5 days and completed in April. His blood tests at the end of taper in April showed normalization of ALT and AST with significant reduction of ALP.\par \par MRI/E Abdo 8/13/18 showed no hepatic masses, bile ducts without choledocholithiasis or stricture, did not showed evidence of PSC, elastography showed no fibrosis. His complete liver workup from July 2018 was negative for viral hepatitis and PBC, ASMA was 1:40, CHARLIE 1:160 and IGG 1881.  \par \par US from 7/21/18 and 3/8/17 showed no hepatic lesions. In December 2, 2015 shows diffuse fatty infiltration and splenomegaly.\par

## 2022-12-21 NOTE — PHYSICAL EXAM
[Non-Tender] : non-tender [Cognitive Mini-Mental Status Normal?] : Cognitive Mini Mental Status Exam is normal [General Appearance - Alert] : alert [General Appearance - In No Acute Distress] : in no acute distress [Sclera] : the sclera and conjunctiva were normal [Outer Ear] : the ears and nose were normal in appearance [Neck Appearance] : the appearance of the neck was normal [Neck Cervical Mass (___cm)] : no neck mass was observed [Jugular Venous Distention Increased] : there was no jugular-venous distention [Thyroid Diffuse Enlargement] : the thyroid was not enlarged [Thyroid Nodule] : there were no palpable thyroid nodules [Auscultation Breath Sounds / Voice Sounds] : lungs were clear to auscultation bilaterally [Heart Rate And Rhythm] : heart rate was normal and rhythm regular [Heart Sounds] : normal S1 and S2 [Heart Sounds Gallop] : no gallops [Murmurs] : no murmurs [Heart Sounds Pericardial Friction Rub] : no pericardial rub [Edema] : there was no peripheral edema [Bowel Sounds] : normal bowel sounds [Abdomen Soft] : soft [Abdomen Tenderness] : non-tender [Abdomen Mass (___ Cm)] : no abdominal mass palpated [Cervical Lymph Nodes Enlarged Posterior Bilaterally] : posterior cervical [Supraclavicular Lymph Nodes Enlarged Bilaterally] : supraclavicular [No CVA Tenderness] : no ~M costovertebral angle tenderness [No Spinal Tenderness] : no spinal tenderness [Abnormal Walk] : normal gait [Nail Clubbing] : no clubbing  or cyanosis of the fingernails [Musculoskeletal - Swelling] : no joint swelling seen [Motor Tone] : muscle strength and tone were normal [Skin Color & Pigmentation] : normal skin color and pigmentation [Skin Turgor] : normal skin turgor [] : no rash [Deep Tendon Reflexes (DTR)] : deep tendon reflexes were 2+ and symmetric [Sensation] : the sensory exam was normal to light touch and pinprick [No Focal Deficits] : no focal deficits [Oriented To Time, Place, And Person] : oriented to person, place, and time [Impaired Insight] : insight and judgment were intact [Affect] : the affect was normal [Scleral Icterus] : No Scleral Icterus [Hepatojugular Reflux] : patient did not have a sustained hepatojugular reflux [Spider Angioma] : No spider angioma(s) were observed [Abdominal Bruit] : no abdominal bruit [Splenomegaly] : no splenomegaly [Asterixis] : no asterixis observed [Jaundice] : No jaundice [Palmar Erythema] : no Palmar Erythema [Depression] : no depression

## 2022-12-21 NOTE — ASSESSMENT
[FreeTextEntry1] : Mr. CAROL CORDOBA is 42 year old male with autoimmune disease on Ursodiol 2 gm/day, Prednisone 10mg/day, Cellcept 500 mg 2 am/1pm BID and He has no complaints at this time.  \par \par MRE 12/09/2022: 3.5 kPa, F1-2, Mild Hepatic steatosis FF 6.3% Labs: Elevated AST/ALT 48/73, , IGG > 1784, GGT < 179, Homogeneous CHARLIE 1:320, ASMA 1:20, WDL-Tbil 0.8, AFP, Vit B12/Folate/D, CBC, INR, TSH/T4, PEth, AMA, A1C. MRI: Patchy areas of restricted diffusion in the liver possibly representing areas of inflammation. Noncirrhotic morphology, No iron overload. \par \par # Elevated Liver Enzymes - Chronic elevated AST/ALT trending down unsteadily since 01/10/22 with 418/677. Most probably r/t AIH.\par + Fibrosis : F1-2 fibrosis on MRE reviewed unchanged c/t 2019. When c/w FS in 2016 scored F4 and F0 in 2017, not a sensitive scores for BMI > 35.\par \par + Elevated ALP with normalized twice in 06/01/22 and 03/31/22, Tbil 1.0 consistently normal since 01/10/2022. \par \par # Autoimmune hepatitis- Labs on 08/24/2022 shows first time WDL- IGG and ASMA since 03/12/2014, AMA, Homogenous CHARLIE 1:320 > 1:160 speckled (07/26/2018) \par He is currently on tapered off Prednisone to 7.5 mg /day, Cellcept 500 mg 2 tabs Am and 1 tab PM. Advised to c/w with 3 meds with our goal is to taper off the prednisone with another stable AI panel as ordered every month till normalized.\par \par > I have reviewed the side effects of prednisone with the patient. I have reviewed the risks of long term treatment. I have explained the risks of developing acne, moon-shaped facies, abdominal striae, a dorsal hump, weight gain, diabetes, cataracts, hypertension, and ischemic necrosis of the hips. I have explained that there may be other side effects as well related to prednisone treatment.\par \par # Metabolic Syndrome - Reviewed the A1C and Lipids with obese class 2 BMI of 39.2 Reviewed the spectrum of disease, the risk of disease progression with added risk factors commonly seen in patients with diabetes or those who are overweight or vice versa, a precursor to the development of diabetes as it is a component of the metabolic syndrome. Advised to F/U with PCP/Cardiology for treatment regimen. \par \par PLAN to F/U in 3 months RPA with monthly call back s/p labs to discuss the medication regimen till the liver enzymes are normalized.\par Encouraged to call back in the interim with any issues or concerns so that we can address and assist as required.\par

## 2022-12-29 ENCOUNTER — APPOINTMENT (OUTPATIENT)
Dept: MRI IMAGING | Facility: CLINIC | Age: 42
End: 2022-12-29

## 2023-01-17 LAB
ALBUMIN SERPL ELPH-MCNC: 4.1 G/DL
ALP BLD-CCNC: 231 U/L
ALT SERPL-CCNC: 136 U/L
ANA PAT FLD IF-IMP: ABNORMAL
ANA SER IF-ACNC: ABNORMAL
ANION GAP SERPL CALC-SCNC: 11 MMOL/L
AST SERPL-CCNC: 82 U/L
BASOPHILS # BLD AUTO: 0.05 K/UL
BASOPHILS NFR BLD AUTO: 0.8 %
BILIRUB SERPL-MCNC: 1.2 MG/DL
BUN SERPL-MCNC: 12 MG/DL
CALCIUM SERPL-MCNC: 9.4 MG/DL
CHLORIDE SERPL-SCNC: 100 MMOL/L
CO2 SERPL-SCNC: 25 MMOL/L
CREAT SERPL-MCNC: 1 MG/DL
EGFR: 97 ML/MIN/1.73M2
EOSINOPHIL # BLD AUTO: 0.09 K/UL
EOSINOPHIL NFR BLD AUTO: 1.5 %
GGT SERPL-CCNC: 203 U/L
HCT VFR BLD CALC: 43.1 %
HGB BLD-MCNC: 14.3 G/DL
IMM GRANULOCYTES NFR BLD AUTO: 0.2 %
LYMPHOCYTES # BLD AUTO: 1.25 K/UL
LYMPHOCYTES NFR BLD AUTO: 20.3 %
MAN DIFF?: NORMAL
MCHC RBC-ENTMCNC: 31 PG
MCHC RBC-ENTMCNC: 33.2 GM/DL
MCV RBC AUTO: 93.5 FL
MONOCYTES # BLD AUTO: 0.71 K/UL
MONOCYTES NFR BLD AUTO: 11.5 %
NEUTROPHILS # BLD AUTO: 4.05 K/UL
NEUTROPHILS NFR BLD AUTO: 65.7 %
PLATELET # BLD AUTO: 258 K/UL
POTASSIUM SERPL-SCNC: 4.9 MMOL/L
PROT SERPL-MCNC: 7.8 G/DL
RBC # BLD: 4.61 M/UL
RBC # FLD: 12.9 %
SODIUM SERPL-SCNC: 136 MMOL/L
WBC # FLD AUTO: 6.16 K/UL

## 2023-01-18 ENCOUNTER — TRANSCRIPTION ENCOUNTER (OUTPATIENT)
Age: 43
End: 2023-01-18

## 2023-01-18 RX ORDER — MYCOPHENOLATE MOFETIL 500 MG/1
500 TABLET ORAL
Qty: 270 | Refills: 3 | Status: DISCONTINUED | COMMUNITY
Start: 2022-02-03 | End: 2023-01-18

## 2023-01-24 ENCOUNTER — NON-APPOINTMENT (OUTPATIENT)
Age: 43
End: 2023-01-24

## 2023-02-03 ENCOUNTER — TRANSCRIPTION ENCOUNTER (OUTPATIENT)
Age: 43
End: 2023-02-03

## 2023-04-07 ENCOUNTER — LABORATORY RESULT (OUTPATIENT)
Age: 43
End: 2023-04-07

## 2023-04-17 LAB
ALBUMIN SERPL ELPH-MCNC: 4.2 G/DL
ALP BLD-CCNC: 191 U/L
ALT SERPL-CCNC: 44 U/L
ANA PAT FLD IF-IMP: ABNORMAL
ANA SER IF-ACNC: ABNORMAL
ANION GAP SERPL CALC-SCNC: 15 MMOL/L
AST SERPL-CCNC: 38 U/L
BILIRUB SERPL-MCNC: 0.5 MG/DL
BUN SERPL-MCNC: 16 MG/DL
CALCIUM SERPL-MCNC: 9.9 MG/DL
CHLORIDE SERPL-SCNC: 104 MMOL/L
CO2 SERPL-SCNC: 21 MMOL/L
CREAT SERPL-MCNC: 0.98 MG/DL
EGFR: 99 ML/MIN/1.73M2
GGT SERPL-CCNC: 188 U/L
POTASSIUM SERPL-SCNC: 4.5 MMOL/L
PROT SERPL-MCNC: 7.8 G/DL
SODIUM SERPL-SCNC: 140 MMOL/L

## 2023-04-28 ENCOUNTER — APPOINTMENT (OUTPATIENT)
Dept: HEPATOLOGY | Facility: CLINIC | Age: 43
End: 2023-04-28
Payer: COMMERCIAL

## 2023-04-28 VITALS
DIASTOLIC BLOOD PRESSURE: 88 MMHG | TEMPERATURE: 98 F | HEART RATE: 86 BPM | RESPIRATION RATE: 16 BRPM | WEIGHT: 315 LBS | HEIGHT: 74 IN | SYSTOLIC BLOOD PRESSURE: 136 MMHG | OXYGEN SATURATION: 97 % | BODY MASS INDEX: 40.43 KG/M2

## 2023-04-28 PROCEDURE — 99215 OFFICE O/P EST HI 40 MIN: CPT

## 2023-04-28 NOTE — HISTORY OF PRESENT ILLNESS
[FreeTextEntry1] : Mr. CAROL CORDOBA is 42-year-old male who is being seen for follow up visit with autoimmune disease on Ursodiol 2 gm/day, Prednisone 7.5 mg/day, Azathioprine 50 mg OD. Feels well, denies any c/o pruritis, Ab pain,  \par \par PH/o was treated with prednisone taper stopping in mid-December 2021. Was off azathioprine (Oct to Nov 2021). He had a sinus infection and took Augmentin for 10 days stopping Sunday, January 2, 2022.  He was taking prednisone 40 mg a day for approximately 3 weeks.  \par \par MRE 12/09/2022: 3.5 kPa, F1-2, Mild Hepatic steatosis FF 6.3% \par MRE 8/28/19 showed stage 1-2 fibrosis with mild steatosis.  \par MRE from 8/13/18 showed stage 1 to 2 fibrosis, no significant steatosis.  \par Fibroscan test performed 9/13/17 showed F0, steatosis S0. \par Fibroscan test done 4/5/16 showed F4.  \par Liver biopsy from February 4, 2014, shows chronic hepatitis and portal fibrosis no evidence of bridging fibrosis or cirrhosis. It was non diagnostic for autoimmune disease and there were some biliary ductal abnormalities. MRCP did not showed evidence of PSC. Reports being told that he has fatty liver on imaging in the past.  \par \par *Labs on 04/07/2023: , Homogeneous CHARLIE 1:160, , normalized AST/ALT 38/44, WDL- CBC, Liver enzymes, Cr.  \par \par *Labs on 01/17/2023: AST/ALT 82/136,  Tx: Switched from Cellcept 500 mg 2am/1pm BID > Azathioprine on 01/18/2023. \par \par 12/09/2022 Labs: Elevated AST/ALT 48/73, , IGG > 1784, GGT < 179, Homogeneous CHARLIE 1:320, ASMA 1:20, WDL-Tbil 0.8, AFP, Vit B12/Folate/D, CBC, INR, TSH/T4, PEth, AMA, A1C. MRI: Patchy areas of restricted diffusion in the liver possibly representing areas of inflammation. Noncirrhotic morphology, No iron overload.  \par \par *Blood test January 26, 2022, , , total bilirubin 0.8.  January 10, 2022, , , total bilirubin 0.7, alkaline phosphatase 236. Switched from Azathioprine 50 mg to Cellcept 500 mg BID in 02/2022 by Dr. Manzano. \par Blood test January 4, 2022, , , alkaline phosphatase 213.  December 10, 2021, , , alkaline phosphatase 198. \par \par Blood test August 23, 2021, alkaline phosphatase 266, ALT 55, AST 50, total bilirubin 0.6, creatinine 0.96. \par Blood test May 1, 2020, alkaline phosphatase 198, ALT 48, AST 49, total bilirubin 0.6 \par Blood tests December 18, 2019, , ALT 38, AST 36. \par On 9/18/19 - ALT 63, AST 56, and .  \par June/2019 blood test showed rising ALT 52, AST 50 and . His Azathioprine was increased to 100 mg daily and he was continued with prednisone 2 mg a day and Ursodiol 2 gm a day.  \par On 2/7/19 showed a rise in liver markers, ,  and , IGG 2241.  \par Started on prednisone 30 mg daily with a rapid taper reducing by 5 mg every 5 days and completed in April. His blood tests at the end of taper in April showed normalization of ALT and AST with significant reduction of ALP. \par \par MRI/E Abdo 8/13/18 showed no hepatic masses, bile ducts without choledocholithiasis or stricture, did not showed evidence of PSC, elastography showed no fibrosis. His complete liver workup from July 2018 was negative for viral hepatitis and PBC, ASMA was 1:40, CHARLIE 1:160 and IGG 1881.   \par US from 7/21/18 and 3/8/17 showed no hepatic lesions. On December 2, 2015, diffuse fatty infiltration and splenomegaly showed. \par \par

## 2023-04-28 NOTE — PHYSICAL EXAM
[Non-Tender] : non-tender [Cognitive Mini-Mental Status Normal?] : Cognitive Mini Mental Status Exam is normal [General Appearance - Alert] : alert [General Appearance - In No Acute Distress] : in no acute distress [Sclera] : the sclera and conjunctiva were normal [Outer Ear] : the ears and nose were normal in appearance [Neck Appearance] : the appearance of the neck was normal [Neck Cervical Mass (___cm)] : no neck mass was observed [Jugular Venous Distention Increased] : there was no jugular-venous distention [Thyroid Diffuse Enlargement] : the thyroid was not enlarged [Thyroid Nodule] : there were no palpable thyroid nodules [Auscultation Breath Sounds / Voice Sounds] : lungs were clear to auscultation bilaterally [Heart Rate And Rhythm] : heart rate was normal and rhythm regular [Heart Sounds] : normal S1 and S2 [Heart Sounds Gallop] : no gallops [Murmurs] : no murmurs [Heart Sounds Pericardial Friction Rub] : no pericardial rub [Edema] : there was no peripheral edema [Abdomen Soft] : soft [Bowel Sounds] : normal bowel sounds [Abdomen Tenderness] : non-tender [Abdomen Mass (___ Cm)] : no abdominal mass palpated [Cervical Lymph Nodes Enlarged Posterior Bilaterally] : posterior cervical [Supraclavicular Lymph Nodes Enlarged Bilaterally] : supraclavicular [No CVA Tenderness] : no ~M costovertebral angle tenderness [No Spinal Tenderness] : no spinal tenderness [Abnormal Walk] : normal gait [Nail Clubbing] : no clubbing  or cyanosis of the fingernails [Musculoskeletal - Swelling] : no joint swelling seen [Motor Tone] : muscle strength and tone were normal [Skin Color & Pigmentation] : normal skin color and pigmentation [Skin Turgor] : normal skin turgor [] : no rash [Deep Tendon Reflexes (DTR)] : deep tendon reflexes were 2+ and symmetric [Sensation] : the sensory exam was normal to light touch and pinprick [No Focal Deficits] : no focal deficits [Oriented To Time, Place, And Person] : oriented to person, place, and time [Impaired Insight] : insight and judgment were intact [Affect] : the affect was normal [Scleral Icterus] : No Scleral Icterus [Hepatojugular Reflux] : patient did not have a sustained hepatojugular reflux [Spider Angioma] : No spider angioma(s) were observed [Abdominal Bruit] : no abdominal bruit [Splenomegaly] : no splenomegaly [Asterixis] : no asterixis observed [Jaundice] : No jaundice [Palmar Erythema] : no Palmar Erythema [Depression] : no depression

## 2023-04-28 NOTE — ASSESSMENT
[FreeTextEntry1] : Mr. CAROL CORDOBA is 42-year-old male who is being seen for follow up visit with autoimmune disease and Fatty liver. \par \par *Labs on 04/07/2023: , Homogeneous CHARLIE 1:160, , normalized AST/ALT 38/44, WDL- CBC, Liver enzymes, Cr.  \par \par C/o Hemorrhoidal bleed? And heart burn – Due to get the EGD/COL for the first time with his GI MD in [facility]. \par \par # Elevated Liver Enzymes - Normalized AST/ALT. Most probably r/t AIH. \par + F1-2 fibrosis on MRE reviewed 3.5 kPa, unchanged c/t 2019. When c/w FS in 2016 scored F4 and F0 in 2017, not a sensitive score for BMI > 35. \par + Elevated ALP with normalized twice in 06/01/22 and 03/31/22, Tbil 1.0 consistently normal since 01/10/2022.  \par \par # Autoimmune hepatitis- C/w Ursodiol 2 gm/day, Prednisone 7.5 mg/day, Azathioprine 50 mg OD. Advised to c/w with 3 meds with our goal is to taper off the prednisone with another stable AI panel as ordered every month till normalized. \par > Switched from Cellcept 500 mg 2am/1pm BID > Azathioprine on 01/18/2023. \par > I have reviewed the side effects of prednisone with the patient. I have reviewed the risks of long-term treatment. I have explained the risks of developing acne, moon-shaped facies, abdominal striae, a dorsal hump, weight gain, diabetes, cataracts, hypertension, and ischemic necrosis of the hips. I have explained that there may be other side effects as well related to prednisone treatment. \par \par # Fatty Liver on MRE 12/09/2022: Mild Hepatic steatosis FF 6.3% \par + Metabolic Syndrome - Reviewed the A1C and Lipids with obese class 3 BMI of 40.7 Reviewed the spectrum of disease, the risk of disease progression with added risk factors commonly seen in patients with diabetes or those who are overweight or vice versa, a precursor to the development of diabetes as it is a component of the metabolic syndrome. Advised to F/U with PCP/Cardiology for treatment regimen.  \par \par PLAN to F/U in 3 months RPA with monthly call back s/p labs to discuss the medication regimen till the liver enzymes are normalized. \par Encouraged to call back in the interim with any issues or concerns so that we can address and assist as required. \par \par

## 2023-05-11 LAB
25(OH)D3 SERPL-MCNC: 35.9 NG/ML
ALBUMIN SERPL ELPH-MCNC: 4.4 G/DL
ALP BLD-CCNC: 205 U/L
ALT SERPL-CCNC: 50 U/L
ANA PAT FLD IF-IMP: ABNORMAL
ANA SER IF-ACNC: ABNORMAL
ANION GAP SERPL CALC-SCNC: 13 MMOL/L
AST SERPL-CCNC: 44 U/L
BASOPHILS # BLD AUTO: 0.06 K/UL
BASOPHILS NFR BLD AUTO: 1.1 %
BILIRUB SERPL-MCNC: 1.2 MG/DL
BUN SERPL-MCNC: 13 MG/DL
CALCIUM SERPL-MCNC: 9.9 MG/DL
CHLORIDE SERPL-SCNC: 103 MMOL/L
CHOLEST SERPL-MCNC: 203 MG/DL
CO2 SERPL-SCNC: 24 MMOL/L
CREAT SERPL-MCNC: 1.04 MG/DL
DEPRECATED KAPPA LC FREE/LAMBDA SER: 1.5 RATIO
EGFR: 93 ML/MIN/1.73M2
EOSINOPHIL # BLD AUTO: 0.15 K/UL
EOSINOPHIL NFR BLD AUTO: 2.8 %
ESTIMATED AVERAGE GLUCOSE: 108 MG/DL
GGT SERPL-CCNC: 240 U/L
HBA1C MFR BLD HPLC: 5.4 %
HCT VFR BLD CALC: 47.2 %
HDLC SERPL-MCNC: 62 MG/DL
HGB BLD-MCNC: 15.2 G/DL
IGA SER QL IEP: 249 MG/DL
IGG SER QL IEP: 1892 MG/DL
IGM SER QL IEP: 134 MG/DL
IMM GRANULOCYTES NFR BLD AUTO: 0.2 %
INR PPP: 1.04 RATIO
KAPPA LC CSF-MCNC: 2.65 MG/DL
KAPPA LC SERPL-MCNC: 3.97 MG/DL
LDLC SERPL CALC-MCNC: 107 MG/DL
LYMPHOCYTES # BLD AUTO: 1.86 K/UL
LYMPHOCYTES NFR BLD AUTO: 35.2 %
MAN DIFF?: NORMAL
MCHC RBC-ENTMCNC: 31.1 PG
MCHC RBC-ENTMCNC: 32.2 GM/DL
MCV RBC AUTO: 96.5 FL
MONOCYTES # BLD AUTO: 0.65 K/UL
MONOCYTES NFR BLD AUTO: 12.3 %
NEUTROPHILS # BLD AUTO: 2.55 K/UL
NEUTROPHILS NFR BLD AUTO: 48.4 %
NONHDLC SERPL-MCNC: 142 MG/DL
PLATELET # BLD AUTO: 269 K/UL
POTASSIUM SERPL-SCNC: 4.5 MMOL/L
PROT SERPL-MCNC: 7.9 G/DL
PT BLD: 12.2 SEC
RBC # BLD: 4.89 M/UL
RBC # FLD: 13.4 %
SODIUM SERPL-SCNC: 140 MMOL/L
TRIGL SERPL-MCNC: 173 MG/DL
TSH SERPL-ACNC: 2.16 UIU/ML
WBC # FLD AUTO: 5.28 K/UL

## 2023-05-12 ENCOUNTER — TRANSCRIPTION ENCOUNTER (OUTPATIENT)
Age: 43
End: 2023-05-12

## 2023-06-23 LAB
INR PPP: 0.93 RATIO
PT BLD: 11 SEC

## 2023-06-26 LAB
25(OH)D3 SERPL-MCNC: 31.2 NG/ML
ALBUMIN SERPL ELPH-MCNC: 4.2 G/DL
ALP BLD-CCNC: 260 U/L
ALT SERPL-CCNC: 47 U/L
ANION GAP SERPL CALC-SCNC: 14 MMOL/L
AST SERPL-CCNC: 48 U/L
BILIRUB SERPL-MCNC: 0.6 MG/DL
BUN SERPL-MCNC: 9 MG/DL
CALCIUM SERPL-MCNC: 9.5 MG/DL
CHLORIDE SERPL-SCNC: 107 MMOL/L
CHOLEST SERPL-MCNC: 178 MG/DL
CO2 SERPL-SCNC: 21 MMOL/L
CREAT SERPL-MCNC: 1.04 MG/DL
DEPRECATED KAPPA LC FREE/LAMBDA SER: 1.3 RATIO
EGFR: 93 ML/MIN/1.73M2
ESTIMATED AVERAGE GLUCOSE: 103 MG/DL
GGT SERPL-CCNC: 256 U/L
HBA1C MFR BLD HPLC: 5.2 %
HDLC SERPL-MCNC: 55 MG/DL
IGA SER QL IEP: 215 MG/DL
IGG SER QL IEP: 1867 MG/DL
IGM SER QL IEP: 121 MG/DL
KAPPA LC CSF-MCNC: 3.01 MG/DL
KAPPA LC SERPL-MCNC: 3.91 MG/DL
LDLC SERPL CALC-MCNC: 86 MG/DL
MITOCHONDRIA AB SER IF-ACNC: NORMAL
NONHDLC SERPL-MCNC: 123 MG/DL
POTASSIUM SERPL-SCNC: 4.1 MMOL/L
PROT SERPL-MCNC: 7.7 G/DL
SODIUM SERPL-SCNC: 142 MMOL/L
TRIGL SERPL-MCNC: 185 MG/DL

## 2023-07-06 ENCOUNTER — APPOINTMENT (OUTPATIENT)
Dept: HEPATOLOGY | Facility: CLINIC | Age: 43
End: 2023-07-06
Payer: COMMERCIAL

## 2023-07-06 VITALS
WEIGHT: 305 LBS | SYSTOLIC BLOOD PRESSURE: 132 MMHG | HEART RATE: 87 BPM | RESPIRATION RATE: 14 BRPM | BODY MASS INDEX: 39.14 KG/M2 | TEMPERATURE: 97.8 F | HEIGHT: 74 IN | DIASTOLIC BLOOD PRESSURE: 85 MMHG | OXYGEN SATURATION: 97 %

## 2023-07-06 DIAGNOSIS — K21.9 GASTRO-ESOPHAGEAL REFLUX DISEASE W/OUT ESOPHAGITIS: ICD-10-CM

## 2023-07-06 PROCEDURE — 99215 OFFICE O/P EST HI 40 MIN: CPT

## 2023-07-06 NOTE — HISTORY OF PRESENT ILLNESS
[FreeTextEntry1] : Mr. CAROL CORDOBA is 43-year-old male who is being seen for follow up visit with autoimmune disease on Ursodiol 2 gm/day, Prednisone 5 mg/day, Azathioprine 100 mg OD. Had sunburnt and red rash over the LEs, Feels well, denies any c/o pruritis, Ab pain.  \par \par PH/o was treated with prednisone taper stopping in mid-December 2021. Was off azathioprine (Oct to Nov 2021). He had a sinus infection and took Augmentin for 10 days stopping Sunday, January 2, 2022.  He was taking prednisone 40 mg a day for approximately 3 weeks.  \par \par MRE 12/09/2022: 3.5 kPa, F1-2, Mild Hepatic steatosis FF 6.3% \par MRE 8/28/19 showed stage 1-2 fibrosis with mild steatosis.  \par MRE from 8/13/18 showed stage 1 to 2 fibrosis, no significant steatosis.  \par Fibroscan test performed 9/13/17 showed F0, steatosis S0. \par Fibroscan test done 4/5/16 showed F4.  \par Liver biopsy from 02/04/2014 shows chronic hepatitis and portal fibrosis no evidence of bridging fibrosis or cirrhosis. It was non diagnostic for autoimmune disease and there were some biliary ductal abnormalities. MRCP did not showed evidence of PSC. Reports being told that he has fatty liver on imaging in the past.  \par \par BW on 06/23/2023: AST/ALT 48/47, , IGG 1867, K/L 3.91/3.01, ; WDL- AMA, A1C, INR, Vit D, CBC; Low Co2 21, ,\par *Tapered down prednisone to 5 mg/Increased the Azathioprine to 100mg/day for AST/ALT 44/50, , and  on 05/10/2023. , Cho 203, LDL/Non-/142.\par \par Labs on 04/07/2023: , Homogeneous CHARLIE 1:160, , normalized AST/ALT 38/44, WDL- CBC, Liver enzymes, Cr.  \par \par *Switched from Cellcept 500 mg 2am/1pm BID > Azathioprine on 01/18/2023 for AST/ALT 82/136,  on 01/17/2023.\par \par 12/09/2022 Labs: Elevated AST/ALT 48/73, , IGG > 1784, GGT < 179, Homogeneous CHARLIE 1:320, ASMA 1:20, WDL-Tbil 0.8, AFP, Vit B12/Folate/D, CBC, INR, TSH/T4, PEth, AMA, A1C. MRI: Patchy areas of restricted diffusion in the liver possibly representing areas of inflammation. Noncirrhotic morphology, No iron overload.  \par \par *Switched from Azathioprine 50 mg to Cellcept 500 mg BID in 02/2022 by Dr. Edgar Manzano for , , TB 0.8 on 01/26/2022.  , , TB 0.7,  on January 10, 2022.\par \par Blood test January 4, 2022, , , alkaline phosphatase 213.  December 10, 2021, , , alkaline phosphatase 198. \par Blood test August 23, 2021, alkaline phosphatase 266, ALT 55, AST 50, total bilirubin 0.6, creatinine 0.96. \par Blood test May 1, 2020, alkaline phosphatase 198, ALT 48, AST 49, total bilirubin 0.6 \par Blood tests December 18, 2019, , ALT 38, AST 36. \par On 9/18/19 - ALT 63, AST 56, and .  \par \par 06/2019 blood test showed rising ALT 52, AST 50 and . \par *His Azathioprine was increased to 100 mg daily and he was continued with prednisone 2 mg a day and Ursodiol 2 gm a day.  \par \par On 2/7/19 showed a rise in liver markers, ,  and , IGG 2241.  *Started on prednisone 30 mg daily with a rapid taper reducing by 5 mg every 5 days and completed in April 2019. His blood tests at the end of taper in April 2019 showed normalization of ALT and AST with significant reduction of ALP. \par \par MRI/E Abdo 8/13/18 showed no hepatic masses, bile ducts without choledocholithiasis or stricture, did not showed evidence of PSC, elastography showed no fibrosis. His complete liver workup from July 2018 was negative for viral hepatitis and PBC, ASMA was 1:40, CHARLIE 1:160 and IGG 1881.   \par US from 7/21/18 and 3/8/17 showed no hepatic lesions. On December 2, 2015, diffuse fatty infiltration and splenomegaly showed. \par

## 2023-07-06 NOTE — ASSESSMENT
[FreeTextEntry1] : Mr. CAROL CORDOBA is 43-year-old male who is being seen for follow up visit with autoimmune disease and Fatty liver. \par \par BW on 06/23/2023: AST/ALT 48/47, , IGG 1867, K/L 3.91/3.01, ; WDL- AMA, A1C, INR, Vit D, CBC; Low Co2 21, , \par \par C/o Hemorrhoidal bleed – Resolved without any interventions.\par C/o Heart burn – On Pepcid 20 mg daily.\par Due to get the EGD/COL for the first time with his GI MD in [facility]. \par \par # Elevated Liver Enzymes ^ AST/ALT reviewed. Most probably r/t AIH. \par + F1-2 fibrosis on MRE reviewed 3.5 kPa, unchanged since 2019. \par > When c/w FS in 2016 scored F4 and F0 in 2017, not a sensitive score for BMI > 35. \par + Elevated ALP with normalized twice in 06/01/22 and 03/31/22, \par > Normal TB consistently since 01/10/2022.  \par \par # Autoimmune hepatitis- D/c Ursodiol 2 gm/day, \par > c/w Prednisone 5 mg/day, \par > Increase the Azathioprine 150 mg OD, > Azathioprine on 01/18/2023.  \par > Advised to c/w with 3 meds with our goal is to taper off the prednisone with 2 stable AI panel as ordered every month till normalized. \par I have reviewed the side effects of prednisone with the patient. I have reviewed the risks of long-term treatment. I have explained the risks of developing acne, moon-shaped facies, abdominal striae, a dorsal hump, weight gain, diabetes, cataracts, hypertension, and ischemic necrosis of the hips. I have explained that there may be other side effects as well related to prednisone treatment. \par \par # MASLD + MRE 12/09/2022: Mild Hepatic steatosis FF 6.3% \par + Metabolic Syndrome - Reviewed the normal A1C and elevated Lipids.\par + Obese class 3 BMI > 40. Been consistently decreasing although not at desired rate.\par Reviewed the spectrum of disease, the risk of disease progression with added risk factors commonly seen in patients with diabetes or those who are overweight or vice versa, a precursor to the development of diabetes as it is a component of the metabolic syndrome. Advised to F/U with PCP. \par \par PLAN to F/U in 3 months RPA s/p Liver Bx and US ab, with monthly call back s/p labs to discuss the medication regimen till the liver enzymes are normalized. \par Encouraged to call back in the interim with any issues or concerns so that we can address and assist as required. \par

## 2023-07-06 NOTE — PHYSICAL EXAM
[Non-Tender] : non-tender [Cognitive Mini-Mental Status Normal?] : Cognitive Mini Mental Status Exam is normal [General Appearance - Alert] : alert [General Appearance - In No Acute Distress] : in no acute distress [Sclera] : the sclera and conjunctiva were normal [Outer Ear] : the ears and nose were normal in appearance [Neck Appearance] : the appearance of the neck was normal [Neck Cervical Mass (___cm)] : no neck mass was observed [Jugular Venous Distention Increased] : there was no jugular-venous distention [Thyroid Diffuse Enlargement] : the thyroid was not enlarged [Thyroid Nodule] : there were no palpable thyroid nodules [Heart Rate And Rhythm] : heart rate was normal and rhythm regular [Auscultation Breath Sounds / Voice Sounds] : lungs were clear to auscultation bilaterally [Heart Sounds] : normal S1 and S2 [Heart Sounds Gallop] : no gallops [Murmurs] : no murmurs [Heart Sounds Pericardial Friction Rub] : no pericardial rub [Edema] : there was no peripheral edema [Bowel Sounds] : normal bowel sounds [Abdomen Soft] : soft [Abdomen Tenderness] : non-tender [Abdomen Mass (___ Cm)] : no abdominal mass palpated [Cervical Lymph Nodes Enlarged Posterior Bilaterally] : posterior cervical [Supraclavicular Lymph Nodes Enlarged Bilaterally] : supraclavicular [No CVA Tenderness] : no ~M costovertebral angle tenderness [No Spinal Tenderness] : no spinal tenderness [Abnormal Walk] : normal gait [Nail Clubbing] : no clubbing  or cyanosis of the fingernails [Musculoskeletal - Swelling] : no joint swelling seen [Motor Tone] : muscle strength and tone were normal [Skin Color & Pigmentation] : normal skin color and pigmentation [Skin Turgor] : normal skin turgor [] : no rash [Deep Tendon Reflexes (DTR)] : deep tendon reflexes were 2+ and symmetric [Sensation] : the sensory exam was normal to light touch and pinprick [No Focal Deficits] : no focal deficits [Oriented To Time, Place, And Person] : oriented to person, place, and time [Impaired Insight] : insight and judgment were intact [Affect] : the affect was normal [Scleral Icterus] : No Scleral Icterus [Hepatojugular Reflux] : patient did not have a sustained hepatojugular reflux [Spider Angioma] : No spider angioma(s) were observed [Abdominal Bruit] : no abdominal bruit [Splenomegaly] : no splenomegaly [Asterixis] : no asterixis observed [Jaundice] : No jaundice [Palmar Erythema] : no Palmar Erythema [Depression] : no depression

## 2023-07-13 ENCOUNTER — TRANSCRIPTION ENCOUNTER (OUTPATIENT)
Age: 43
End: 2023-07-13

## 2023-07-14 ENCOUNTER — TRANSCRIPTION ENCOUNTER (OUTPATIENT)
Age: 43
End: 2023-07-14

## 2023-07-17 ENCOUNTER — TRANSCRIPTION ENCOUNTER (OUTPATIENT)
Age: 43
End: 2023-07-17

## 2023-07-18 ENCOUNTER — OUTPATIENT (OUTPATIENT)
Dept: OUTPATIENT SERVICES | Facility: HOSPITAL | Age: 43
LOS: 1 days | End: 2023-07-18
Payer: COMMERCIAL

## 2023-07-18 ENCOUNTER — APPOINTMENT (OUTPATIENT)
Dept: ULTRASOUND IMAGING | Facility: CLINIC | Age: 43
End: 2023-07-18
Payer: COMMERCIAL

## 2023-07-18 DIAGNOSIS — K76.0 FATTY (CHANGE OF) LIVER, NOT ELSEWHERE CLASSIFIED: ICD-10-CM

## 2023-07-18 PROCEDURE — 76700 US EXAM ABDOM COMPLETE: CPT | Mod: 26

## 2023-07-18 PROCEDURE — 76700 US EXAM ABDOM COMPLETE: CPT

## 2023-07-25 ENCOUNTER — TRANSCRIPTION ENCOUNTER (OUTPATIENT)
Age: 43
End: 2023-07-25

## 2023-07-25 LAB
ALBUMIN SERPL ELPH-MCNC: 4.4 G/DL
ALP BLD-CCNC: 348 U/L
ALT SERPL-CCNC: 51 U/L
ANION GAP SERPL CALC-SCNC: 13 MMOL/L
AST SERPL-CCNC: 61 U/L
BILIRUB SERPL-MCNC: 0.8 MG/DL
BUN SERPL-MCNC: 14 MG/DL
CALCIUM SERPL-MCNC: 9.7 MG/DL
CHLORIDE SERPL-SCNC: 103 MMOL/L
CO2 SERPL-SCNC: 24 MMOL/L
CREAT SERPL-MCNC: 0.97 MG/DL
EGFR: 100 ML/MIN/1.73M2
FOLATE SERPL-MCNC: 8.1 NG/ML
GGT SERPL-CCNC: 710 U/L
IGG SER QL IEP: 1801 MG/DL
INR PPP: 1.05 RATIO
POTASSIUM SERPL-SCNC: 4.4 MMOL/L
PROT SERPL-MCNC: 8 G/DL
PT BLD: 12.2 SEC
SODIUM SERPL-SCNC: 139 MMOL/L
VIT B12 SERPL-MCNC: 212 PG/ML

## 2023-07-26 ENCOUNTER — TRANSCRIPTION ENCOUNTER (OUTPATIENT)
Age: 43
End: 2023-07-26

## 2023-07-26 LAB — MITOCHONDRIA AB SER IF-ACNC: NORMAL

## 2023-08-01 ENCOUNTER — RESULT REVIEW (OUTPATIENT)
Age: 43
End: 2023-08-01

## 2023-08-01 ENCOUNTER — OUTPATIENT (OUTPATIENT)
Dept: OUTPATIENT SERVICES | Facility: HOSPITAL | Age: 43
LOS: 1 days | Discharge: HOME | End: 2023-08-01
Payer: COMMERCIAL

## 2023-08-01 ENCOUNTER — TRANSCRIPTION ENCOUNTER (OUTPATIENT)
Age: 43
End: 2023-08-01

## 2023-08-01 VITALS
DIASTOLIC BLOOD PRESSURE: 86 MMHG | TEMPERATURE: 99 F | SYSTOLIC BLOOD PRESSURE: 141 MMHG | WEIGHT: 300.05 LBS | HEIGHT: 74 IN | RESPIRATION RATE: 16 BRPM

## 2023-08-01 DIAGNOSIS — K75.4 AUTOIMMUNE HEPATITIS: ICD-10-CM

## 2023-08-01 PROCEDURE — 88307 TISSUE EXAM BY PATHOLOGIST: CPT | Mod: 26

## 2023-08-01 PROCEDURE — 47000 NEEDLE BIOPSY OF LIVER PERQ: CPT

## 2023-08-01 PROCEDURE — 88312 SPECIAL STAINS GROUP 1: CPT

## 2023-08-01 PROCEDURE — 76942 ECHO GUIDE FOR BIOPSY: CPT | Mod: 26

## 2023-08-01 PROCEDURE — 88312 SPECIAL STAINS GROUP 1: CPT | Mod: 26

## 2023-08-01 PROCEDURE — 88307 TISSUE EXAM BY PATHOLOGIST: CPT

## 2023-08-01 PROCEDURE — 88313 SPECIAL STAINS GROUP 2: CPT | Mod: 26

## 2023-08-01 PROCEDURE — 88313 SPECIAL STAINS GROUP 2: CPT

## 2023-08-01 PROCEDURE — 76942 ECHO GUIDE FOR BIOPSY: CPT

## 2023-08-01 NOTE — ASU DISCHARGE PLAN (ADULT/PEDIATRIC) - NS MD DC FALL RISK RISK
For information on Fall & Injury Prevention, visit: [email or web address] OR  [email or web address] OR  https://www.cdc.gov/steadi/patient.html

## 2023-08-01 NOTE — ASU DISCHARGE PLAN (ADULT/PEDIATRIC) - ASU DC SPECIAL INSTRUCTIONSFT
Biopsy/ Aspiration    Discharge Instructions  - You have had a biopsy/ aspiration of your liver  - You may shower in 24 hours.  - Keep the area covered and dry for the next 24 hours.  - Do not perform any heavy lifting until the site is healed.  - You may resume your normal diet.  - You may resume your normal medications however you should wait 24 hours before restarting aspirin, plavix, or blood thinners.  - It is normal to experience some pain over the site for the next few days. You may take apply ice to the area (20 minutes on, 20 minutes off) and take Tylenol for that pain. Do not take more frequently than every 6 hours and do not exceed more than 3000mg of Tylenol in a 24 hour period.    Notify your primary physician and/or Interventional Radiology IMMEDIATELY if you experience any of the following       - Fever of 101F or 38C       - Chills or Rigors/ Shakes       - Swelling and/or Redness in the area around the biopsy site       - Worsening Pain       - Blood soaked bandages or worsening bleeding       - Lightheadedness and/or dizziness upon standing       - Chest Pain/ Tightness       - Shortness of Breath       - Difficulty walking    If you have a problem that you believe requires IMMEDIATE attention, please go to your NEAREST Emergency Room. If you believe your problem can safely wait until you speak to a physician, please call Interventional Radiology for any concerns.    During Normal Weekday Business Hours- You can contact the Interventional Radiology department during normal business hours via telephone.  During Evenings and Weekends- If you need to contact Interventional Radiology during off hours, do so by calling the hospital and requesting to be connected to the Interventional Radiologist on call.

## 2023-08-01 NOTE — PROGRESS NOTE ADULT - SUBJECTIVE AND OBJECTIVE BOX
IR Post Procedure Note    Diagnosis: Elevated LFTs    Procedure: Liver Biopsy    : Darnell Esparza MD    Contrast: None    Anesthesia: 1% Lidocaine Subcutaneous, Sedation administered by Anesthesiology/ Moderate Sedation    Estimated Blood Loss: Less than 10cc    Specimens: Specimens identified, labeled, confirmed and sent to lab.    Complications: No Immediate Complications    Anticoagulation: Resume in 48 Hours    Findings & Plan: Single 18g core bx of liver obtained w Biopince biopsy needle under US guidance. No hematoma or complications on post procedure US.      Please call Interventional Radiology with any questions, concerns, or issues.

## 2023-08-07 LAB
PETH 16:0/18:1: 49 NG/ML
PETH 16:0/18:2: 35 NG/ML
PETH COMMENTS: NORMAL

## 2023-08-08 LAB
ALBUMIN SERPL ELPH-MCNC: 4.5 G/DL
ALP BLD-CCNC: 303 U/L
ALT SERPL-CCNC: 37 U/L
ANION GAP SERPL CALC-SCNC: 14 MMOL/L
AST SERPL-CCNC: 45 U/L
BILIRUB SERPL-MCNC: 0.5 MG/DL
BUN SERPL-MCNC: 12 MG/DL
CALCIUM SERPL-MCNC: 10 MG/DL
CHLORIDE SERPL-SCNC: 105 MMOL/L
CO2 SERPL-SCNC: 21 MMOL/L
CREAT SERPL-MCNC: 0.96 MG/DL
EGFR: 101 ML/MIN/1.73M2
GGT SERPL-CCNC: 641 U/L
GLUCOSE SERPL-MCNC: 97 MG/DL
IGG SER QL IEP: 1615 MG/DL
INR PPP: 0.87 RATIO
MITOCHONDRIA AB SER IF-ACNC: NORMAL
POTASSIUM SERPL-SCNC: 4.1 MMOL/L
PROT SERPL-MCNC: 7.7 G/DL
PT BLD: 9.9 SEC
SODIUM SERPL-SCNC: 140 MMOL/L
VIT B12 SERPL-MCNC: 365 PG/ML

## 2023-08-09 ENCOUNTER — TRANSCRIPTION ENCOUNTER (OUTPATIENT)
Age: 43
End: 2023-08-09

## 2023-08-09 LAB — LKM AB SER QL IF: <20.1 UNITS

## 2023-08-10 LAB — SURGICAL PATHOLOGY STUDY: SIGNIFICANT CHANGE UP

## 2023-08-11 LAB
ANA PAT FLD IF-IMP: ABNORMAL
ANA SER IF-ACNC: ABNORMAL

## 2023-09-07 ENCOUNTER — APPOINTMENT (OUTPATIENT)
Dept: HEPATOLOGY | Facility: CLINIC | Age: 43
End: 2023-09-07
Payer: COMMERCIAL

## 2023-09-07 VITALS
TEMPERATURE: 97.2 F | HEIGHT: 74 IN | WEIGHT: 300 LBS | SYSTOLIC BLOOD PRESSURE: 124 MMHG | DIASTOLIC BLOOD PRESSURE: 81 MMHG | BODY MASS INDEX: 38.5 KG/M2 | OXYGEN SATURATION: 98 % | HEART RATE: 68 BPM

## 2023-09-07 PROCEDURE — 99215 OFFICE O/P EST HI 40 MIN: CPT

## 2023-09-07 RX ORDER — AZATHIOPRINE 50 1/1
50 TABLET ORAL DAILY
Qty: 270 | Refills: 3 | Status: ACTIVE | COMMUNITY
Start: 2023-01-18 | End: 1900-01-01

## 2023-09-08 NOTE — HISTORY OF PRESENT ILLNESS
[FreeTextEntry1] : Mr. CAROL CORDOBA is 43-year-old male who is being seen for follow up visit with autoimmune disease Prednisone 5 mg/day, Azathioprine 100 mg OD. Had sunburnt and red rash over the LEs, Feels well, denies any c/o pruritis, Ab pain.    PH/o was treated with prednisone taper stopping in mid-December 2021. Was off azathioprine (Oct to Nov 2021). He had a sinus infection and took Augmentin for 10 days stopping Sunday, January 2, 2022.  He was taking prednisone 40 mg a day for approximately 3 weeks. Off Ursodiol 2 gm/day, Medication Titrations* noted with labs with below.  Liver Bx on 08/01/2023: Chronic hepatitis with mild activity and periportal fibrosis with delicate early bridging fibrosis (Edmond and Trenton grade 2 of 4, stage 2-3 of 4) MRE 12/09/2022: 3.5 kPa, F1-2, Mild Hepatic steatosis FF 6.3%  Liver Bx on 9/22/2021: Chronic hepatitis with bile duct damage and portal fibrosis with periportal septae MRE 8/28/19 showed stage 1-2 fibrosis with mild steatosis.   MRE from 8/13/18 showed stage 1 to 2 fibrosis, no significant steatosis.   Fibroscan test performed 9/13/17 showed F0, steatosis S0.  Fibroscan test done 4/5/16 showed F4.   Liver Bx on 02/04/2014: chronic hepatitis and portal fibrosis no evidence of bridging fibrosis or cirrhosis. It was non diagnostic for autoimmune disease and there were some biliary ductal abnormalities. MRCP did not showed evidence of PSC. Reports being told that he has fatty liver on imaging in the past.    BW on 08/07/2023: AST 45, , , CHARLIE 1:80, Normalized ALT 37, Noted Low Co2 21. US abs on 07/18/2023: Hepatic steatosis. * *Tapered down prednisone to 2.5 mg/ Increased the Azathioprine to 150 mg/day   BW on 06/23/2023: AST/ALT 48/47, , IGG 1867, K/L 3.91/3.01, ; WDL- AMA, A1C, INR, Vit D, CBC; Low Co2 21, ,*Tapered down prednisone to 5 mg/Increased the Azathioprine to 100 mg/day for AST/ALT 44/50, , and  on 05/10/2023. , Cho 203, LDL/Non-/142.  Labs on 04/07/2023: , Homogeneous CHARLIE 1:160, , normalized AST/ALT 38/44, WDL- CBC, Liver enzymes, Cr. *Switched from Cellcept 500 mg 2am/1pm BID > Azathioprine on 01/18/2023 for AST/ALT 82/136,  on 01/17/2023.  12/09/2022 Labs: Elevated AST/ALT 48/73, , IGG > 1784, GGT < 179, Homogeneous CHARLIE 1:320, ASMA 1:20, WDL-Tbil 0.8, AFP, Vit B12/Folate/D, CBC, INR, TSH/T4, PEth, AMA, A1C. MRI: Patchy areas of restricted diffusion in the liver possibly representing areas of inflammation. Noncirrhotic morphology, No iron overload.   *Switched from Azathioprine 50 mg to Cellcept 500 mg BID in 02/2022 by Dr. Edgar Manzano for , , TB 0.8 on 01/26/2022.  , , TB 0.7,  on January 10, 2022. Blood test January 4, 2022, , , alkaline phosphatase 213.  December 10, 2021, , , alkaline phosphatase 198.  Blood test August 23, 2021, alkaline phosphatase 266, ALT 55, AST 50, total bilirubin 0.6, creatinine 0.96.  Blood test May 1, 2020, alkaline phosphatase 198, ALT 48, AST 49, total bilirubin 0.6  Blood tests December 18, 2019, , ALT 38, AST 36. On 9/18/19 - ALT 63, AST 56, and .   06/2019 blood test showed rising ALT 52, AST 50 and . *His Azathioprine was increased to 100 mg daily and he was continued with prednisone 2 mg a day and Ursodiol 2 gm a day.   On 2/7/19 showed a rise in liver markers, ,  and , IGG 2241.  *Started on prednisone 30 mg daily with a rapid taper reducing by 5 mg every 5 days and completed in April 2019. His blood tests at the end of taper in April 2019 showed normalization of ALT and AST with significant reduction of ALP.  MRI/E Abdo 8/13/18 showed no hepatic masses, bile ducts without choledocholithiasis or stricture, did not showed evidence of PSC, elastography showed no fibrosis. His complete liver workup from July 2018 was negative for viral hepatitis and PBC, ASMA was 1:40, CHARLIE 1:160 and IGG 1881.    US from 7/21/18 and 3/8/17 showed no hepatic lesions.  On December 2, 2015, diffuse fatty infiltration and splenomegaly showed.

## 2023-09-08 NOTE — ASSESSMENT
[FreeTextEntry1] : Mr. CAROL CORDOBA is 43-year-old male who is being seen for follow up visit with autoimmune disease and Fatty liver.   BW on 08/07/2023: AST 45, , , CHARLIE 1:80, Normalized ALT 37, Noted Low Co2 21. *Tapered down prednisone to 2.5 mg/ Increased the Azathioprine to 150 mg/day. Liver Bx on 08/01/2023: Chronic hepatitis with mild activity and periportal fibrosis with delicate early bridging fibrosis (Edmond and Trenton grade 2 of 4, stage 2-3 of 4) US abs on 07/18/2023: Hepatic steatosis.   # Elevated Liver Enzymes ^ AST/ALT reviewed. Most probably r/t AIH.  + Fibrosis (Edmond and Trenton grade 2 of 4, stage 2-3 of 4) c/t F1-2 fibrosis on MRE reviewed 3.5 kPa, unchanged since 2019. Discussed the sensititvity vs accuarcy with gold standard fiborsis staging. > EV check due with EGD in [facility]. > When c/w FS in 2016 scored F4 and F0 in 2017, not a sensitive score for BMI > 35.  + Elevated ALP with normalized twice in 06/01/22 and 03/31/22,  > Normal TB consistently since 01/10/2022.    # Autoimmune hepatitis: > Tappered down Prednisone 2.5 mg/day > Remain on Azathioprine 150 mg OD with Ph/o on Azathioprine since 01/18/2023.   > Advised to c/w with dual meds with our goal is to taper off the prednisone with 2 consistent LFTs and stable AI panel as ordered every month till normalized.  I have reviewed the side effects of prednisone with the patient. I have reviewed the risks of long-term treatment. I have explained the risks of developing acne, moon-shaped facies, abdominal striae, a dorsal hump, weight gain, diabetes, cataracts, hypertension, and ischemic necrosis of the hips. I have explained that there may be other side effects as well related to prednisone treatment.   # MASLD + MRE 12/09/2022: Mild Hepatic steatosis FF 6.3%  + Metabolic Syndrome - Reviewed the normal A1C and elevated Lipids. + Obese class 3 BMI > 40. Been consistently decreasing although not at desired rate. > GLP1: Liraglutide (lir" a gloo' tide) is a glucagon-like peptide-1 (GLP-1) analogue that acts like the native gastrointestinal hormone (incretin) to increase insulin secretion. Liraglutide reproduces the activity of GLP-1, binding to specific receptors on pancreatic beta cells and increasing insulin secretion, which can lead to improvement of glycemic control in patients with type 2 diabetes. The typical initial dose is 0.6 mg once daily, which can be increased to a maximum of 1.8 mg daily. Liraglutide is generally well tolerated, but side effects can be dose limiting and include nausea, vomiting, diarrhea, dizziness, headache, fatigue and rash. Will task pharmacy to check with his insurance coverge.  + Peth Reviewed on 07/24/2023: Advised to stop drinking alchol with the elevated liver nezymes an advanced fibrosis on LBx.  C/o Hemorrhoidal bleed - Resolved without any interventions. C/o Heart burn - Resolved on Pepcid 20 mg daily. + EGD/COL for the first time with his GI MD in [facility].   PLAN to F/U in 2 months RPA s/p Labs, with monthly call back s/p labs to discuss the medication regimen till the liver enzymes are normalized.  Encouraged to call back in the interim with any issues or concerns so that we can address and assist as required.

## 2023-10-04 ENCOUNTER — TRANSCRIPTION ENCOUNTER (OUTPATIENT)
Age: 43
End: 2023-10-04

## 2023-11-09 ENCOUNTER — APPOINTMENT (OUTPATIENT)
Dept: HEPATOLOGY | Facility: CLINIC | Age: 43
End: 2023-11-09

## 2023-11-20 LAB
AFP-TM SERPL-MCNC: 3.6 NG/ML
ALBUMIN SERPL ELPH-MCNC: 4.5 G/DL
ALP BLD-CCNC: 224 U/L
ALT SERPL-CCNC: 39 U/L
ANION GAP SERPL CALC-SCNC: 10 MMOL/L
AST SERPL-CCNC: 51 U/L
BILIRUB SERPL-MCNC: 1.3 MG/DL
BUN SERPL-MCNC: 13 MG/DL
CALCIUM SERPL-MCNC: 9.8 MG/DL
CHLORIDE SERPL-SCNC: 106 MMOL/L
CO2 SERPL-SCNC: 25 MMOL/L
CREAT SERPL-MCNC: 1.04 MG/DL
EGFR: 92 ML/MIN/1.73M2
FOLATE SERPL-MCNC: >20 NG/ML
GGT SERPL-CCNC: 474 U/L
HCT VFR BLD CALC: 42.1 %
HGB BLD-MCNC: 14.8 G/DL
IGG SER QL IEP: 1554 MG/DL
INR PPP: 0.98 RATIO
MCHC RBC-ENTMCNC: 33 PG
MCHC RBC-ENTMCNC: 35.2 GM/DL
MCV RBC AUTO: 93.8 FL
PLATELET # BLD AUTO: 209 K/UL
POTASSIUM SERPL-SCNC: 4.6 MMOL/L
PROT SERPL-MCNC: 7.6 G/DL
PT BLD: 11.1 SEC
RBC # BLD: 4.49 M/UL
RBC # FLD: 13.6 %
SODIUM SERPL-SCNC: 141 MMOL/L
VIT B12 SERPL-MCNC: 344 PG/ML
WBC # FLD AUTO: 3.77 K/UL

## 2023-11-22 LAB
ANA PAT FLD IF-IMP: ABNORMAL
ANA SER IF-ACNC: ABNORMAL

## 2023-12-15 ENCOUNTER — APPOINTMENT (OUTPATIENT)
Dept: HEPATOLOGY | Facility: CLINIC | Age: 43
End: 2023-12-15

## 2024-04-17 ENCOUNTER — NON-APPOINTMENT (OUTPATIENT)
Age: 44
End: 2024-04-17

## 2024-04-18 ENCOUNTER — TRANSCRIPTION ENCOUNTER (OUTPATIENT)
Age: 44
End: 2024-04-18

## 2024-04-19 ENCOUNTER — TRANSCRIPTION ENCOUNTER (OUTPATIENT)
Age: 44
End: 2024-04-19

## 2024-06-10 ENCOUNTER — OUTPATIENT (OUTPATIENT)
Dept: OUTPATIENT SERVICES | Facility: HOSPITAL | Age: 44
LOS: 1 days | End: 2024-06-10
Payer: COMMERCIAL

## 2024-06-10 ENCOUNTER — APPOINTMENT (OUTPATIENT)
Dept: ULTRASOUND IMAGING | Facility: CLINIC | Age: 44
End: 2024-06-10
Payer: COMMERCIAL

## 2024-06-10 DIAGNOSIS — K75.4 AUTOIMMUNE HEPATITIS: ICD-10-CM

## 2024-06-10 PROCEDURE — 76700 US EXAM ABDOM COMPLETE: CPT | Mod: 26

## 2024-06-10 PROCEDURE — 76700 US EXAM ABDOM COMPLETE: CPT

## 2024-06-17 ENCOUNTER — APPOINTMENT (OUTPATIENT)
Age: 44
End: 2024-06-17

## 2024-06-26 LAB
ALBUMIN SERPL ELPH-MCNC: 4.2 G/DL
ALP BLD-CCNC: 374 U/L
ALT SERPL-CCNC: 70 U/L
ANION GAP SERPL CALC-SCNC: 12 MMOL/L
AST SERPL-CCNC: 96 U/L
BILIRUB SERPL-MCNC: 0.8 MG/DL
BUN SERPL-MCNC: 10 MG/DL
CALCIUM SERPL-MCNC: 9.5 MG/DL
CHLORIDE SERPL-SCNC: 107 MMOL/L
CO2 SERPL-SCNC: 22 MMOL/L
CREAT SERPL-MCNC: 0.97 MG/DL
EGFR: 100 ML/MIN/1.73M2
ESTIMATED AVERAGE GLUCOSE: 91 MG/DL
HBA1C MFR BLD HPLC: 4.8 %
HCT VFR BLD CALC: 44.6 %
HGB BLD-MCNC: 14.8 G/DL
MCHC RBC-ENTMCNC: 32.3 PG
MCHC RBC-ENTMCNC: 33.2 GM/DL
MCV RBC AUTO: 97.4 FL
MITOCHONDRIA AB SER IF-ACNC: NORMAL
PLATELET # BLD AUTO: 202 K/UL
POTASSIUM SERPL-SCNC: 4.8 MMOL/L
PROT SERPL-MCNC: 8.4 G/DL
RBC # BLD: 4.58 M/UL
RBC # FLD: 14 %
SODIUM SERPL-SCNC: 141 MMOL/L
WBC # FLD AUTO: 4.94 K/UL

## 2024-06-27 LAB
ANA PAT FLD IF-IMP: ABNORMAL
ANA SER IF-ACNC: ABNORMAL
AST SERPL W P-5'-P-CCNC: 113 IU/L
CHOLEST SERPL-MCNC: 217 MG/DL
COMMENT:: NORMAL
FIBROSIS STAGE SERPL QL: NORMAL
FIBROSURE ALPHA 2 MACROGLOBULINS: 185 MG/DL
FIBROSURE ALT (SGPT): 103 IU/L
FIBROSURE APOLIPOPROTEIN A1: 149 MG/DL
FIBROSURE GGT: 1028 IU/L
FIBROSURE HAPTOGLOBIN: 137 MG/DL
FIBROSURE SCORING: NORMAL
FIBROSURE TOTAL BILIRUBIN: 0.7 MG/DL
GLUCOSE SERPL-MCNC: 118 MG/DL
INTERPRETATIONS:: NORMAL
LIVER FIBR SCORE SERPL CALC.FIBROSURE: 0.54
Lab: NORMAL
NASH SCORING: NORMAL
NECROINFLAMMATORY ACT GRADE SERPL QL: NORMAL
NECROINFLAMMATORY ACT SCORE SERPL: 0.89
SERVICE CMNT-IMP: NORMAL
STEATOSIS GRADE: NORMAL
STEATOSIS SCORE: 0.86
STEATOSIS SCORING: NORMAL
TRIGL SERPL-MCNC: 125 MG/DL

## 2024-06-28 ENCOUNTER — APPOINTMENT (OUTPATIENT)
Dept: HEPATOLOGY | Facility: CLINIC | Age: 44
End: 2024-06-28
Payer: COMMERCIAL

## 2024-06-28 VITALS
SYSTOLIC BLOOD PRESSURE: 137 MMHG | RESPIRATION RATE: 14 BRPM | HEIGHT: 74 IN | TEMPERATURE: 98.3 F | DIASTOLIC BLOOD PRESSURE: 84 MMHG | BODY MASS INDEX: 38.76 KG/M2 | WEIGHT: 302 LBS | HEART RATE: 75 BPM | OXYGEN SATURATION: 97 %

## 2024-06-28 DIAGNOSIS — K75.4 AUTOIMMUNE HEPATITIS: ICD-10-CM

## 2024-06-28 DIAGNOSIS — Z51.81 ENCOUNTER FOR THERAPEUTIC DRUG LVL MONITORING: ICD-10-CM

## 2024-06-28 DIAGNOSIS — K74.02 HEPATIC FIBROSIS, ADVANCED FIBROSIS: ICD-10-CM

## 2024-06-28 DIAGNOSIS — R74.8 ABNORMAL LEVELS OF OTHER SERUM ENZYMES: ICD-10-CM

## 2024-06-28 DIAGNOSIS — K76.0 FATTY (CHANGE OF) LIVER, NOT ELSEWHERE CLASSIFIED: ICD-10-CM

## 2024-06-28 DIAGNOSIS — E66.9 OBESITY, UNSPECIFIED: ICD-10-CM

## 2024-06-28 PROCEDURE — 99215 OFFICE O/P EST HI 40 MIN: CPT

## 2024-07-01 PROBLEM — K74.02 HEPATIC FIBROSIS, STAGE 3: Status: ACTIVE | Noted: 2023-09-07

## 2024-07-01 PROBLEM — E66.9 OBESITY, CLASS II, BMI 35-39.9: Status: ACTIVE | Noted: 2022-11-23

## 2024-07-01 PROBLEM — R74.8 ALKALINE PHOSPHATASE ELEVATION: Status: ACTIVE | Noted: 2019-06-07

## 2024-07-01 PROBLEM — Z51.81 MEDICATION TITRATED: Status: ACTIVE | Noted: 2023-07-06

## 2024-07-11 ENCOUNTER — RX RENEWAL (OUTPATIENT)
Age: 44
End: 2024-07-11

## 2024-08-15 DIAGNOSIS — K75.4 AUTOIMMUNE HEPATITIS: ICD-10-CM

## 2024-08-15 DIAGNOSIS — R74.8 ABNORMAL LEVELS OF OTHER SERUM ENZYMES: ICD-10-CM

## 2024-08-19 LAB
ALBUMIN SERPL ELPH-MCNC: 4.1 G/DL
ALP BLD-CCNC: 350 U/L
ALT SERPL-CCNC: 61 U/L
ANION GAP SERPL CALC-SCNC: 14 MMOL/L
AST SERPL-CCNC: 81 U/L
BILIRUB DIRECT SERPL-MCNC: 0.7 MG/DL
BILIRUB SERPL-MCNC: 1.5 MG/DL
BUN SERPL-MCNC: 13 MG/DL
CALCIUM SERPL-MCNC: 9.4 MG/DL
CHLORIDE SERPL-SCNC: 106 MMOL/L
CO2 SERPL-SCNC: 22 MMOL/L
CREAT SERPL-MCNC: 0.87 MG/DL
EGFR: 110 ML/MIN/1.73M2
HCT VFR BLD CALC: 42.6 %
HGB BLD-MCNC: 13.9 G/DL
INR PPP: 0.97 RATIO
MCHC RBC-ENTMCNC: 32.6 GM/DL
MCHC RBC-ENTMCNC: 32.9 PG
MCV RBC AUTO: 100.9 FL
MITOCHONDRIA AB SER IF-ACNC: NORMAL
PLATELET # BLD AUTO: 164 K/UL
POTASSIUM SERPL-SCNC: 4.3 MMOL/L
PROT SERPL-MCNC: 7.6 G/DL
PT BLD: 11.1 SEC
RBC # BLD: 4.22 M/UL
RBC # FLD: 14.4 %
SODIUM SERPL-SCNC: 142 MMOL/L
WBC # FLD AUTO: 3.54 K/UL

## 2024-08-22 LAB
PETH 16:0/18:1: NEGATIVE NG/ML
PETH 16:0/18:2: NEGATIVE NG/ML
PETH COMMENTS: NORMAL

## 2024-08-30 ENCOUNTER — APPOINTMENT (OUTPATIENT)
Dept: HEPATOLOGY | Facility: CLINIC | Age: 44
End: 2024-08-30

## 2024-08-30 VITALS
OXYGEN SATURATION: 98 % | HEART RATE: 81 BPM | TEMPERATURE: 96.3 F | DIASTOLIC BLOOD PRESSURE: 86 MMHG | HEIGHT: 74 IN | RESPIRATION RATE: 14 BRPM | BODY MASS INDEX: 37.47 KG/M2 | SYSTOLIC BLOOD PRESSURE: 127 MMHG | WEIGHT: 292 LBS

## 2024-08-30 DIAGNOSIS — Z78.9 OTHER SPECIFIED HEALTH STATUS: ICD-10-CM

## 2024-08-30 DIAGNOSIS — K74.02 HEPATIC FIBROSIS, ADVANCED FIBROSIS: ICD-10-CM

## 2024-08-30 DIAGNOSIS — K76.0 FATTY (CHANGE OF) LIVER, NOT ELSEWHERE CLASSIFIED: ICD-10-CM

## 2024-08-30 DIAGNOSIS — Z23 ENCOUNTER FOR IMMUNIZATION: ICD-10-CM

## 2024-08-30 DIAGNOSIS — K75.4 AUTOIMMUNE HEPATITIS: ICD-10-CM

## 2024-08-30 DIAGNOSIS — R74.8 ABNORMAL LEVELS OF OTHER SERUM ENZYMES: ICD-10-CM

## 2024-08-30 DIAGNOSIS — E66.9 OBESITY, UNSPECIFIED: ICD-10-CM

## 2024-08-30 DIAGNOSIS — Z51.81 ENCOUNTER FOR THERAPEUTIC DRUG LVL MONITORING: ICD-10-CM

## 2024-08-30 PROCEDURE — 99215 OFFICE O/P EST HI 40 MIN: CPT

## 2024-08-30 RX ORDER — URSODIOL 500 MG/1
500 TABLET ORAL TWICE DAILY
Qty: 360 | Refills: 0 | Status: ACTIVE | COMMUNITY
Start: 2024-08-30 | End: 1900-01-01

## 2024-08-31 PROBLEM — Z78.9 IMMUNE TO HEPATITIS B: Status: ACTIVE | Noted: 2024-08-31

## 2024-08-31 PROBLEM — Z23 NEED FOR HEPATITIS A VACCINATION: Status: ACTIVE | Noted: 2024-08-31

## 2024-08-31 LAB
25(OH)D3 SERPL-MCNC: 37.2 NG/ML
BILIRUB DIRECT SERPL-MCNC: 0.4 MG/DL
DEPRECATED KAPPA LC FREE/LAMBDA SER: 1.66 RATIO
GGT SERPL-CCNC: 823 U/L
HCT VFR BLD CALC: 42.5 %
HGB BLD-MCNC: 14.1 G/DL
IGA SER QL IEP: 192 MG/DL
IGG SER QL IEP: 1692 MG/DL
IGM SER QL IEP: 159 MG/DL
INR PPP: 0.86 RATIO
KAPPA LC CSF-MCNC: 1.77 MG/DL
KAPPA LC SERPL-MCNC: 2.94 MG/DL
MCHC RBC-ENTMCNC: 33 PG
MCHC RBC-ENTMCNC: 33.2 GM/DL
MCV RBC AUTO: 99.5 FL
PLATELET # BLD AUTO: 154 K/UL
PT BLD: 9.8 SEC
RBC # BLD: 4.27 M/UL
RBC # FLD: 14.8 %
WBC # FLD AUTO: 5.32 K/UL

## 2024-08-31 NOTE — HISTORY OF PRESENT ILLNESS
[FreeTextEntry1] : Mr. CAROL CORDOBA is 44-year-old male who is being seen for follow up visit with autoimmune disease on Azathioprine 150 mg OD and MASLD not on any meds, Lost 10 lbs in 2 months, Obese class 3 BMI 37.5, weighs 292 lbs. by alcohol abstinence, healthy dieting and exercise. Feels well, denies any c/o pruritis, Ab pain.  + Interval events of sinus congestion and completed Doxycycline and on Prednisone 40 mg for this week.  PH/o was treated with prednisone taper stopping in mid-December 2021. Was off azathioprine (Oct to Nov 2021). He had a sinus infection and took Augmentin for 10 days stopping Sunday, January 2, 2022.  He was taking prednisone 40 mg a day for approximately 3 weeks. Off Ursodiol 2 gm/day, Medication Titrations* noted with labs with below. * Stage 2-3 of 4 on 08/01/2023 Liver Bx: Chronic hepatitis with mild activity and periportal fibrosis with delicate early bridging fibrosis (Edmond and Trenton grade 2 of 4) MRE 12/09/2022: 3.5 kPa, F1-2, Mild Hepatic steatosis FF 6.3%  * Liver Bx on 9/22/2021: Chronic hepatitis with bile duct damage and portal fibrosis with periportal septate MRE 8/28/19 showed stage 1-2 fibrosis with mild steatosis.   MRE from 8/13/18 showed stage 1 to 2 fibrosis, no significant steatosis.   Fibroscan test performed 9/13/17 showed F0, steatosis S0.  Fibroscan test done 4/5/16 showed F4.   * Liver Bx on 02/04/2014: chronic hepatitis and portal fibrosis no evidence of bridging fibrosis or cirrhosis. It was non diagnostic for autoimmune disease and there were some biliary ductal abnormalities. MRCP did not showed evidence of PSC. Reports being told that he has fatty liver on imaging in the past.    BW on 08/30/2024: AST/ALT 81/71, , DB 0.4, , IGG 1692, Kappa 2.94/1.66 ratio, Normalized TB 0.9, WBC 5.32, MCV 99.5, WDL- INR, Vit D.   BW on 08/16/2024: AST/ALT 81/61, ALP//1.5, DB 0.7, , WBC 3.54, .9, IGG 1985, Kappa 4.40/1.86 ratio, Homogeneous CHARLIE 1:320; WDL- PLT, INR, BMP, AMA, anti-LKM, TSH, Neg Peth < 52/44 (06/26/24)   BW on 06/22/2024: F2 N3 S2-S3 in [facility]+ GGT 1028, AST//103, , TPro 8.4, Cho 217, Glucose 118, Homogeneous ^ CHARLIE 1:320, WDL-AMA, CBC, A1c 4.8% (added on Peth due to be resulted)  US ab on 06/10/2024: Limited evaluation with No focal hepatic abnormality noted.  BW on 08/07/2023: AST 45, , , CHARLIE 1:80, Normalized ALT 37, Noted Low Co2 21. US abs on 07/18/2023: Hepatic steatosis. * *Tapered down prednisone to 2.5 mg/ Increased the Azathioprine to 150 mg/day  BW on 06/23/2023: AST/ALT 48/47, , IGG 1867, K/L 3.91/3.01, ; WDL- AMA, A1C, INR, Vit D, CBC; Low Co2 21, ,*Tapered down prednisone to 5 mg/Increased the Azathioprine to 100 mg/day for AST/ALT 44/50, , and  on 05/10/2023. , Cho 203, LDL/Non-/142. Labs on 04/07/2023: , Homogeneous CHARLIE 1:160, , normalized AST/ALT 38/44, WDL- CBC, Liver enzymes, Cr. *Switched from Cellcept 500 mg 2am/1pm BID > Azathioprine on 01/18/2023 for AST/ALT 82/136,  on 01/17/2023. 12/09/2022 Labs: Elevated AST/ALT 48/73, , IGG > 1784, GGT < 179, Homogeneous CHARLIE 1:320, ASMA 1:20, WDL-Tbil 0.8, AFP, Vit B12/Folate/D, CBC, INR, TSH/T4, PEth, AMA, A1C. MRI: Patchy areas of restricted diffusion in the liver possibly representing areas of inflammation. Noncirrhotic morphology, No iron overload.   *Switched from Azathioprine 50 mg to Cellcept 500 mg BID in 02/2022 by Dr. MARKELL Manzano for , , TB 0.8 on 01/26/2022.  , , TB 0.7,  on January 10, 2022. Blood test January 4, 2022, , , alkaline phosphatase 213.  December 10, 2021, , , alkaline phosphatase 198.  Blood test August 23, 2021, alkaline phosphatase 266, ALT 55, AST 50, total bilirubin 0.6, creatinine 0.96.  Blood test May 1, 2020, alkaline phosphatase 198, ALT 48, AST 49, total bilirubin 0.6  Blood tests December 18, 2019, , ALT 38, AST 36. On 9/18/19 - ALT 63, AST 56, and .   06/2019 blood test showed rising ALT 52, AST 50 and . *His Azathioprine was increased to 100 mg daily and he was continued with prednisone 2 mg a day and Ursodiol 2 gm a day.   On 2/7/19 showed a rise in liver markers, ,  and , IGG 2241. *Started on prednisone 30 mg daily with a rapid taper reducing by 5 mg every 5 days and completed in April 2019. BW at the end of taper in April 2019 showed normalization of ALT and AST with significant reduction of ALP.  MRI/E Abdo 8/13/18 showed no hepatic masses, bile ducts without choledocholithiasis or stricture, did not showed evidence of PSC, elastography showed no fibrosis.  HEP workup from July 2018 was negative for viral hepatitis and PBC, ASMA was 1:40, CHARLIE 1:160 and IGG 1881.    US from 7/21/18 and 3/8/17 showed no hepatic lesions. On December 2, 2015, diffuse fatty infiltration and splenomegaly showed.

## 2024-08-31 NOTE — ASSESSMENT
[FreeTextEntry1] : Mr. CAROL CORDOBA is 44-year-old male who is being seen for follow up visit with autoimmune disease on Azathioprine 150 mg OD and MASLD.  BW on 08/30/2024: AST/ALT 81/71, , DB 0.4, , IGG 1692, Kappa 2.94/1.66 ratio, Normalized TB 0.9, WBC 5.32, MCV 99.5, WDL- INR, Vit D.  # Elevated Liver Enzymes:  Normalized TB noted in the repeated labs today, could be from prednisone Tx. > New cholestatic pattern of with TB elevations noted (08/16/24). MRCP ordered, pending FA. > Ph/o Elevated ALP with normalized twice in 06/01/22 and 03/31/22. Normal TB consistently since 01/10/2022.  + Recently completed Doxycycline and on Prednisone 40 mg advised to taper off 20 mg for 2 days > 10 mg for 2 days and 5 mg for 2 days. Will recheck the BW as ordered in 2 weeks. + AST/ALT usually < 3x ULN.  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 > EV check due with EGD in [facility] (he works there) c/w FS in 2016 scored F4 and F0 in 2017, not a sensitive score for BMI > 35.  > Trending down PLT noted.  # Autoimmune hepatitis: Normalized WBC could be from the sinus infection and use of doxycycline. + Low WBC (08/16/24): Will decrease the Azathioprine to 100 mg, on Azathioprine since 01/18/2023.   > Was off Prednisone since 06/2023. Could add on Budesonide 3mg after completing the Prednisone 40 mg dose. > Started on Ursodiol 1 gm BD dose (15 mg/kg/day) empirically as was effective in the past. > Advised to c/w with dual meds with our goal of keeping off the prednisone with 2 consistent LFTs and stable AI panel as ordered every month till normalized.   # MASLD + MRE 12/09/2022: Mild Hepatic steatosis FF 6.3%  + HLD - Reviewed the normal A1C and elevated Lipids. > Willing to try Rezdiffra to Tx Fibrotic Fatty Liver. Tasked pharmacy to check for the insurance coverage.  + Obese class 3 BMI > 40. Been consistently decreasing although not at desired rate. MASLD not on any meds, lost 10 lbs in 2 months, Obese class 3 BMI 37.5, weighs 292 lbs. by alcohol abstinence, healthy dieting and exercise. > GLP1: Did not want to start on any weight loss meds yet.  + Peth Reviewed on 07/24/2023: Advised to stop drinking alcohol with the elevated liver enzymes an advanced fibrosis on LBx.  # RHM: C/o Hemorrhoidal bleed - Resolved without any interventions. C/o Heart burn - Resolved on Pepcid 20 mg daily. + EGD/COL for the first time with his GI MD in [facility].   PLAN to F/U in 2 months RPA s/p Labs, with monthly call back s/p labs to discuss the medication regimen till the liver enzymes are normalized.  Encouraged to call back in the interim with any issues or concerns so that we can address and assist as required.

## 2024-09-04 ENCOUNTER — TRANSCRIPTION ENCOUNTER (OUTPATIENT)
Age: 44
End: 2024-09-04

## 2024-09-04 LAB
ALBUMIN SERPL ELPH-MCNC: 4.3 G/DL
ALP BLD-CCNC: 326 U/L
ALT SERPL-CCNC: 71 U/L
ANA PAT FLD IF-IMP: ABNORMAL
ANA SER IF-ACNC: ABNORMAL
ANION GAP SERPL CALC-SCNC: 13 MMOL/L
AST SERPL W P-5'-P-CCNC: 93 IU/L
AST SERPL-CCNC: 81 U/L
BILIRUB SERPL-MCNC: 0.9 MG/DL
BUN SERPL-MCNC: 15 MG/DL
CALCIUM SERPL-MCNC: 9.2 MG/DL
CHLORIDE SERPL-SCNC: 105 MMOL/L
CHOLEST SERPL-MCNC: 213 MG/DL
CO2 SERPL-SCNC: 23 MMOL/L
COMMENT:: NORMAL
CREAT SERPL-MCNC: 0.82 MG/DL
EGFR: 112 ML/MIN/1.73M2
FIBROSIS STAGE SERPL QL: NORMAL
FIBROSURE ALPHA 2 MACROGLOBULINS: 160 MG/DL
FIBROSURE ALT (SGPT): 100 IU/L
FIBROSURE APOLIPOPROTEIN A1: 143 MG/DL
FIBROSURE GGT: 815 IU/L
FIBROSURE HAPTOGLOBIN: 114 MG/DL
FIBROSURE SCORING: NORMAL
FIBROSURE TOTAL BILIRUBIN: 0.8 MG/DL
GLUCOSE SERPL-MCNC: 109 MG/DL
INTERPRETATIONS:: NORMAL
LIVER FIBR SCORE SERPL CALC.FIBROSURE: 0.51
Lab: NORMAL
MITOCHONDRIA AB SER IF-ACNC: NORMAL
NASH SCORING: NORMAL
NECROINFLAMMATORY ACT GRADE SERPL QL: NORMAL
NECROINFLAMMATORY ACT SCORE SERPL: 0.86
POTASSIUM SERPL-SCNC: 4.3 MMOL/L
PROT SERPL-MCNC: 7.4 G/DL
SERVICE CMNT-IMP: NORMAL
SODIUM SERPL-SCNC: 141 MMOL/L
STEATOSIS GRADE: NORMAL
STEATOSIS SCORE: 0.85
STEATOSIS SCORING: NORMAL
TRIGL SERPL-MCNC: 124 MG/DL

## 2024-09-11 ENCOUNTER — TRANSCRIPTION ENCOUNTER (OUTPATIENT)
Age: 44
End: 2024-09-11

## 2024-09-11 DIAGNOSIS — K75.4 AUTOIMMUNE HEPATITIS: ICD-10-CM

## 2024-09-16 LAB
ALBUMIN SERPL ELPH-MCNC: 4.1 G/DL
ALP BLD-CCNC: 226 U/L
ALT SERPL-CCNC: 55 U/L
AST SERPL-CCNC: 36 U/L
BILIRUB DIRECT SERPL-MCNC: 0.3 MG/DL
BILIRUB INDIRECT SERPL-MCNC: 0.4 MG/DL
BILIRUB SERPL-MCNC: 0.8 MG/DL
GGT SERPL-CCNC: 400 U/L
PROT SERPL-MCNC: 7.2 G/DL

## 2024-09-16 RX ORDER — BUDESONIDE 3 MG/1
3 CAPSULE, COATED PELLETS ORAL
Qty: 180 | Refills: 2 | Status: ACTIVE | COMMUNITY
Start: 2024-09-16 | End: 1900-01-01

## 2024-09-19 ENCOUNTER — OUTPATIENT (OUTPATIENT)
Dept: OUTPATIENT SERVICES | Facility: HOSPITAL | Age: 44
LOS: 1 days | Discharge: HOME | End: 2024-09-19

## 2024-09-19 DIAGNOSIS — R79.9 ABNORMAL FINDING OF BLOOD CHEMISTRY, UNSPECIFIED: ICD-10-CM

## 2024-09-24 ENCOUNTER — NON-APPOINTMENT (OUTPATIENT)
Age: 44
End: 2024-09-24

## 2024-09-25 ENCOUNTER — TRANSCRIPTION ENCOUNTER (OUTPATIENT)
Age: 44
End: 2024-09-25

## 2024-09-25 ENCOUNTER — APPOINTMENT (OUTPATIENT)
Dept: HEMATOLOGY ONCOLOGY | Facility: CLINIC | Age: 44
End: 2024-09-25
Payer: COMMERCIAL

## 2024-09-25 ENCOUNTER — LABORATORY RESULT (OUTPATIENT)
Age: 44
End: 2024-09-25

## 2024-09-25 VITALS
DIASTOLIC BLOOD PRESSURE: 79 MMHG | HEART RATE: 76 BPM | WEIGHT: 287.48 LBS | TEMPERATURE: 97.9 F | BODY MASS INDEX: 36.89 KG/M2 | HEIGHT: 74 IN | OXYGEN SATURATION: 94 % | SYSTOLIC BLOOD PRESSURE: 116 MMHG

## 2024-09-25 DIAGNOSIS — R76.8 OTHER SPECIFIED ABNORMAL IMMUNOLOGICAL FINDINGS IN SERUM: ICD-10-CM

## 2024-09-25 DIAGNOSIS — K74.02 HEPATIC FIBROSIS, ADVANCED FIBROSIS: ICD-10-CM

## 2024-09-25 DIAGNOSIS — K75.4 AUTOIMMUNE HEPATITIS: ICD-10-CM

## 2024-09-25 PROCEDURE — 99205 OFFICE O/P NEW HI 60 MIN: CPT

## 2024-09-25 NOTE — HISTORY OF PRESENT ILLNESS
[de-identified] : 44-year-old male with a h/o autoimmune disease on Azathioprine 150 mg OD, LIver disease, MASLD not on any meds, now referred to our ofifice with Elevated light chains per hepatology    QI 8/20/24: IGG 1692  IG A 192 Ig M 159 Kappa 2.9 Lambda 1.77 Ratio 1.66  8/12/24: IGG 1985  IG A 221 Ig M 154 Kappa 4.4 Lambda 2.36 Ratio 1.86  6/23/23 : IGG 1867  IG A 215  Ig M121 Kappa 3.91  Lambda 3.01 Ratio 1.3 [de-identified] : Patient here for initial visit  Feels well  Recently started Budesonide for Autoimmune hepatitis  Cut out alcohol completely a few months ago and feels well  Eating healthier

## 2024-09-26 LAB
ALBUMIN SERPL ELPH-MCNC: 4.5 G/DL
ALP BLD-CCNC: 206 U/L
ALT SERPL-CCNC: 56 U/L
ANION GAP SERPL CALC-SCNC: 15 MMOL/L
AST SERPL-CCNC: 58 U/L
B2 MICROGLOB SERPL-MCNC: 2.3 MG/L
BILIRUB SERPL-MCNC: 0.6 MG/DL
BUN SERPL-MCNC: 14 MG/DL
CALCIUM SERPL-MCNC: 9.7 MG/DL
CHLORIDE SERPL-SCNC: 105 MMOL/L
CO2 SERPL-SCNC: 23 MMOL/L
CREAT SERPL-MCNC: 0.91 MG/DL
EGFR: 107 ML/MIN/1.73M2
GLUCOSE SERPL-MCNC: 98 MG/DL
POTASSIUM SERPL-SCNC: 4.7 MMOL/L
PROT SERPL-MCNC: 7.5 G/DL
SODIUM SERPL-SCNC: 142 MMOL/L

## 2024-09-27 LAB
ALBUMIN SERPL ELPH-MCNC: 4.4 G/DL
ALP BLD-CCNC: 203 U/L
ALT SERPL-CCNC: 54 U/L
ANION GAP SERPL CALC-SCNC: 18 MMOL/L
AST SERPL-CCNC: 50 U/L
BILIRUB SERPL-MCNC: 0.6 MG/DL
BUN SERPL-MCNC: 13 MG/DL
CALCIUM SERPL-MCNC: 9.6 MG/DL
CHLORIDE SERPL-SCNC: 104 MMOL/L
CO2 SERPL-SCNC: 21 MMOL/L
CREAT SERPL-MCNC: 0.92 MG/DL
EGFR: 106 ML/MIN/1.73M2
GGT SERPL-CCNC: 242 U/L
IGG4 SER-MCNC: 57.6 MG/DL
POTASSIUM SERPL-SCNC: 4.4 MMOL/L
PROT SERPL-MCNC: 7.7 G/DL
SODIUM SERPL-SCNC: 143 MMOL/L

## 2024-09-30 LAB
ALBUMIN MFR SERPL ELPH: 56.8 %
ALBUMIN SERPL-MCNC: 4.3 G/DL
ALBUMIN/GLOB SERPL: 1.3 RATIO
ALPHA1 GLOB MFR SERPL ELPH: 3.9 %
ALPHA1 GLOB SERPL ELPH-MCNC: 0.3 G/DL
ALPHA2 GLOB MFR SERPL ELPH: 7.4 %
ALPHA2 GLOB SERPL ELPH-MCNC: 0.6 G/DL
B-GLOBULIN MFR SERPL ELPH: 12 %
B-GLOBULIN SERPL ELPH-MCNC: 0.9 G/DL
DEPRECATED KAPPA LC FREE/LAMBDA SER: 1.39 RATIO
GAMMA GLOB FLD ELPH-MCNC: 1.5 G/DL
GAMMA GLOB MFR SERPL ELPH: 19.9 %
IGA 24H UR QL IFE: NORMAL
IGA SER QL IEP: 193 MG/DL
IGG SER QL IEP: 1291 MG/DL
IGM SER QL IEP: 139 MG/DL
INTERPRETATION SERPL IEP-IMP: NORMAL
KAPPA LC CSF-MCNC: 1.44 MG/DL
KAPPA LC SERPL-MCNC: 2 MG/DL
M PROTEIN SPEC IFE-MCNC: NORMAL
PROT SERPL-MCNC: 7.5 G/DL
PROT SERPL-MCNC: 7.5 G/DL

## 2024-10-28 ENCOUNTER — TRANSCRIPTION ENCOUNTER (OUTPATIENT)
Age: 44
End: 2024-10-28

## 2024-10-31 ENCOUNTER — APPOINTMENT (OUTPATIENT)
Dept: MRI IMAGING | Facility: CLINIC | Age: 44
End: 2024-10-31

## 2024-11-01 ENCOUNTER — TRANSCRIPTION ENCOUNTER (OUTPATIENT)
Age: 44
End: 2024-11-01

## 2024-11-25 ENCOUNTER — APPOINTMENT (OUTPATIENT)
Dept: MRI IMAGING | Facility: CLINIC | Age: 44
End: 2024-11-25

## 2024-11-26 ENCOUNTER — APPOINTMENT (OUTPATIENT)
Dept: MRI IMAGING | Facility: IMAGING CENTER | Age: 44
End: 2024-11-26

## 2024-11-27 DIAGNOSIS — K75.4 AUTOIMMUNE HEPATITIS: ICD-10-CM

## 2024-11-27 DIAGNOSIS — E66.812 OBESITY, CLASS 2: ICD-10-CM

## 2024-11-27 DIAGNOSIS — K74.02 HEPATIC FIBROSIS, ADVANCED FIBROSIS: ICD-10-CM

## 2024-12-02 ENCOUNTER — TRANSCRIPTION ENCOUNTER (OUTPATIENT)
Age: 44
End: 2024-12-02

## 2024-12-03 ENCOUNTER — TRANSCRIPTION ENCOUNTER (OUTPATIENT)
Age: 44
End: 2024-12-03

## 2024-12-06 ENCOUNTER — APPOINTMENT (OUTPATIENT)
Dept: HEPATOLOGY | Facility: CLINIC | Age: 44
End: 2024-12-06

## 2025-01-06 DIAGNOSIS — K75.4 AUTOIMMUNE HEPATITIS: ICD-10-CM

## 2025-01-08 ENCOUNTER — TRANSCRIPTION ENCOUNTER (OUTPATIENT)
Age: 45
End: 2025-01-08

## 2025-01-29 ENCOUNTER — TRANSCRIPTION ENCOUNTER (OUTPATIENT)
Age: 45
End: 2025-01-29

## 2025-01-30 ENCOUNTER — TRANSCRIPTION ENCOUNTER (OUTPATIENT)
Age: 45
End: 2025-01-30

## 2025-02-13 ENCOUNTER — APPOINTMENT (OUTPATIENT)
Dept: HEPATOLOGY | Facility: CLINIC | Age: 45
End: 2025-02-13

## 2025-02-20 ENCOUNTER — NON-APPOINTMENT (OUTPATIENT)
Age: 45
End: 2025-02-20

## 2025-03-07 ENCOUNTER — APPOINTMENT (OUTPATIENT)
Dept: HEPATOLOGY | Facility: CLINIC | Age: 45
End: 2025-03-07

## 2025-03-11 ENCOUNTER — OUTPATIENT (OUTPATIENT)
Dept: OUTPATIENT SERVICES | Facility: HOSPITAL | Age: 45
LOS: 1 days | Discharge: HOME | End: 2025-03-11

## 2025-03-11 DIAGNOSIS — R79.9 ABNORMAL FINDING OF BLOOD CHEMISTRY, UNSPECIFIED: ICD-10-CM

## 2025-03-25 ENCOUNTER — APPOINTMENT (OUTPATIENT)
Dept: HEMATOLOGY ONCOLOGY | Facility: CLINIC | Age: 45
End: 2025-03-25
Payer: COMMERCIAL

## 2025-03-25 ENCOUNTER — RESULT REVIEW (OUTPATIENT)
Age: 45
End: 2025-03-25

## 2025-03-25 VITALS
SYSTOLIC BLOOD PRESSURE: 112 MMHG | BODY MASS INDEX: 33.62 KG/M2 | HEIGHT: 74 IN | DIASTOLIC BLOOD PRESSURE: 74 MMHG | WEIGHT: 262 LBS | HEART RATE: 79 BPM | OXYGEN SATURATION: 96 %

## 2025-03-25 DIAGNOSIS — R76.8 OTHER SPECIFIED ABNORMAL IMMUNOLOGICAL FINDINGS IN SERUM: ICD-10-CM

## 2025-03-25 LAB
BASOPHILS # BLD AUTO: 0.02 K/UL — SIGNIFICANT CHANGE UP (ref 0–0.2)
BASOPHILS NFR BLD AUTO: 0.5 % — SIGNIFICANT CHANGE UP (ref 0–2)
EOSINOPHIL # BLD AUTO: 0.06 K/UL — SIGNIFICANT CHANGE UP (ref 0–0.5)
EOSINOPHIL NFR BLD AUTO: 1.4 % — SIGNIFICANT CHANGE UP (ref 0–6)
HCT VFR BLD CALC: 42.8 % — SIGNIFICANT CHANGE UP (ref 39–50)
HGB BLD-MCNC: 14.4 G/DL — SIGNIFICANT CHANGE UP (ref 13–17)
IMM GRANULOCYTES # BLD AUTO: 0.01 K/UL — SIGNIFICANT CHANGE UP (ref 0–0.07)
IMM GRANULOCYTES NFR BLD AUTO: 0.2 % — SIGNIFICANT CHANGE UP (ref 0–0.9)
LYMPHOCYTES # BLD AUTO: 1.14 K/UL — SIGNIFICANT CHANGE UP (ref 1–3.3)
LYMPHOCYTES NFR BLD AUTO: 27.1 % — SIGNIFICANT CHANGE UP (ref 13–44)
MCHC RBC-ENTMCNC: 31.4 PG — SIGNIFICANT CHANGE UP (ref 27–34)
MCHC RBC-ENTMCNC: 33.6 G/DL — SIGNIFICANT CHANGE UP (ref 32–36)
MCV RBC AUTO: 93.2 FL — SIGNIFICANT CHANGE UP (ref 80–100)
MONOCYTES # BLD AUTO: 0.36 K/UL — SIGNIFICANT CHANGE UP (ref 0–0.9)
MONOCYTES NFR BLD AUTO: 8.6 % — SIGNIFICANT CHANGE UP (ref 2–14)
NEUTROPHILS # BLD AUTO: 2.61 K/UL — SIGNIFICANT CHANGE UP (ref 1.8–7.4)
NEUTROPHILS NFR BLD AUTO: 62.2 % — SIGNIFICANT CHANGE UP (ref 43–77)
NRBC # BLD AUTO: 0 K/UL — SIGNIFICANT CHANGE UP (ref 0–0)
NRBC # FLD: 0 K/UL — SIGNIFICANT CHANGE UP (ref 0–0)
NRBC BLD AUTO-RTO: 0 /100 WBCS — SIGNIFICANT CHANGE UP (ref 0–0)
PLATELET # BLD AUTO: 171 K/UL — SIGNIFICANT CHANGE UP (ref 150–400)
PMV BLD: 11.5 FL — SIGNIFICANT CHANGE UP (ref 7–13)
RBC # BLD: 4.59 M/UL — SIGNIFICANT CHANGE UP (ref 4.2–5.8)
RBC # FLD: 12.6 % — SIGNIFICANT CHANGE UP (ref 10.3–14.5)
WBC # BLD: 4.2 K/UL — SIGNIFICANT CHANGE UP (ref 3.8–10.5)
WBC # FLD AUTO: 4.2 K/UL — SIGNIFICANT CHANGE UP (ref 3.8–10.5)

## 2025-03-25 PROCEDURE — 99214 OFFICE O/P EST MOD 30 MIN: CPT

## 2025-03-31 LAB
ALBUMIN MFR SERPL ELPH: 55.5 %
ALBUMIN SERPL ELPH-MCNC: 4.1 G/DL
ALBUMIN SERPL-MCNC: 4.1 G/DL
ALBUMIN/GLOB SERPL: 1.2 RATIO
ALP BLD-CCNC: 205 U/L
ALPHA1 GLOB MFR SERPL ELPH: 4 %
ALPHA1 GLOB SERPL ELPH-MCNC: 0.3 G/DL
ALPHA2 GLOB MFR SERPL ELPH: 8.3 %
ALPHA2 GLOB SERPL ELPH-MCNC: 0.6 G/DL
ALT SERPL-CCNC: 70 U/L
ANION GAP SERPL CALC-SCNC: 9 MMOL/L
AST SERPL-CCNC: 58 U/L
B-GLOBULIN MFR SERPL ELPH: 11.5 %
B-GLOBULIN SERPL ELPH-MCNC: 0.9 G/DL
BILIRUB SERPL-MCNC: 0.6 MG/DL
BUN SERPL-MCNC: 15 MG/DL
CALCIUM SERPL-MCNC: 9.5 MG/DL
CHLORIDE SERPL-SCNC: 103 MMOL/L
CO2 SERPL-SCNC: 27 MMOL/L
CREAT SERPL-MCNC: 0.91 MG/DL
DEPRECATED KAPPA LC FREE/LAMBDA SER: 1.3 RATIO
EGFRCR SERPLBLD CKD-EPI 2021: 107 ML/MIN/1.73M2
GAMMA GLOB FLD ELPH-MCNC: 1.5 G/DL
GAMMA GLOB MFR SERPL ELPH: 20.7 %
GLUCOSE SERPL-MCNC: 92 MG/DL
IGA SER QL IEP: 216 MG/DL
IGG SER QL IEP: 1561 MG/DL
IGM SER QL IEP: 126 MG/DL
INTERPRETATION SERPL IEP-IMP: NORMAL
KAPPA LC CSF-MCNC: 1.81 MG/DL
KAPPA LC SERPL-MCNC: 2.36 MG/DL
M PROTEIN SPEC IFE-MCNC: NORMAL
POTASSIUM SERPL-SCNC: 4.4 MMOL/L
PROT SERPL-MCNC: 7.1 G/DL
PROT SERPL-MCNC: 7.4 G/DL
PROT SERPL-MCNC: 7.4 G/DL
SODIUM SERPL-SCNC: 139 MMOL/L

## 2025-05-15 DIAGNOSIS — K76.0 FATTY (CHANGE OF) LIVER, NOT ELSEWHERE CLASSIFIED: ICD-10-CM

## 2025-05-19 ENCOUNTER — APPOINTMENT (OUTPATIENT)
Dept: HEPATOLOGY | Facility: CLINIC | Age: 45
End: 2025-05-19
Payer: COMMERCIAL

## 2025-05-19 VITALS
BODY MASS INDEX: 31.83 KG/M2 | DIASTOLIC BLOOD PRESSURE: 84 MMHG | HEIGHT: 74 IN | SYSTOLIC BLOOD PRESSURE: 124 MMHG | WEIGHT: 248 LBS | OXYGEN SATURATION: 98 % | TEMPERATURE: 97.6 F | HEART RATE: 67 BPM

## 2025-05-19 DIAGNOSIS — K74.02 HEPATIC FIBROSIS, ADVANCED FIBROSIS: ICD-10-CM

## 2025-05-19 LAB
AFP-TM SERPL-MCNC: 3.5 NG/ML
ALBUMIN SERPL ELPH-MCNC: 4.2 G/DL
ALP BLD-CCNC: 253 U/L
ALT SERPL-CCNC: 82 U/L
ANION GAP SERPL CALC-SCNC: 12 MMOL/L
AST SERPL-CCNC: 65 U/L
BILIRUB DIRECT SERPL-MCNC: 0.3 MG/DL
BILIRUB SERPL-MCNC: 0.8 MG/DL
BUN SERPL-MCNC: 10 MG/DL
CALCIUM SERPL-MCNC: 9.7 MG/DL
CHLORIDE SERPL-SCNC: 105 MMOL/L
CHOLEST SERPL-MCNC: 167 MG/DL
CO2 SERPL-SCNC: 24 MMOL/L
CREAT SERPL-MCNC: 0.85 MG/DL
EGFRCR SERPLBLD CKD-EPI 2021: 111 ML/MIN/1.73M2
ESTIMATED AVERAGE GLUCOSE: 100 MG/DL
GGT SERPL-CCNC: 109 U/L
GLUCOSE SERPL-MCNC: 95 MG/DL
HBA1C MFR BLD HPLC: 5.1 %
HCT VFR BLD CALC: 42.5 %
HDLC SERPL-MCNC: 58 MG/DL
HGB BLD-MCNC: 14 G/DL
INR PPP: 0.97 RATIO
LDLC SERPL-MCNC: 90 MG/DL
MCHC RBC-ENTMCNC: 32.1 PG
MCHC RBC-ENTMCNC: 32.9 G/DL
MCV RBC AUTO: 97.5 FL
NONHDLC SERPL-MCNC: 110 MG/DL
PLATELET # BLD AUTO: 175 K/UL
POTASSIUM SERPL-SCNC: 4.8 MMOL/L
PROT SERPL-MCNC: 7.2 G/DL
PT BLD: 11.6 SEC
RBC # BLD: 4.36 M/UL
RBC # FLD: 13.9 %
SODIUM SERPL-SCNC: 142 MMOL/L
TRIGL SERPL-MCNC: 110 MG/DL
TSH SERPL-ACNC: 0.84 UIU/ML
WBC # FLD AUTO: 3.41 K/UL

## 2025-05-19 PROCEDURE — 99215 OFFICE O/P EST HI 40 MIN: CPT

## 2025-05-19 RX ORDER — CALCIUM/D3/MAG OX/COP/MANG/ZN 600 MG-20
600-800 TABLET ORAL
Qty: 90 | Refills: 3 | Status: ACTIVE | COMMUNITY
Start: 2025-05-19 | End: 1900-01-01

## 2025-05-20 LAB
IGG SERPL-MCNC: 1651 MG/DL
IGG1 SER-MCNC: 1027 MG/DL
IGG2 SER-MCNC: 371 MG/DL
IGG3 SER-MCNC: 190.3 MG/DL
IGG4 SER-MCNC: 57.6 MG/DL

## 2025-05-22 DIAGNOSIS — R74.9 ABNORMAL SERUM ENZYME LEVEL, UNSPECIFIED: ICD-10-CM

## 2025-05-22 DIAGNOSIS — R74.8 ABNORMAL LEVELS OF OTHER SERUM ENZYMES: ICD-10-CM

## 2025-05-22 LAB
ALP BLD-CCNC: 257 IU/L
ALP BONE CFR SERPL: 24 %
ALP INTEST CFR SERPL: 5 %
ALP LIVER CFR SERPL: 71 %

## 2025-05-22 RX ORDER — URSODIOL 500 MG/1
500 TABLET ORAL
Qty: 270 | Refills: 1 | Status: ACTIVE | COMMUNITY
Start: 2025-05-22 | End: 1900-01-01

## 2025-05-22 RX ORDER — URSODIOL 300 MG/1
300 CAPSULE ORAL
Qty: 450 | Refills: 1 | Status: DISCONTINUED | COMMUNITY
Start: 2025-05-19 | End: 2025-05-22

## 2025-05-23 LAB
6-MMPN: 377
6-TGN: 311

## 2025-05-24 ENCOUNTER — OUTPATIENT (OUTPATIENT)
Dept: OUTPATIENT SERVICES | Facility: HOSPITAL | Age: 45
LOS: 1 days | End: 2025-05-24
Payer: COMMERCIAL

## 2025-05-24 ENCOUNTER — APPOINTMENT (OUTPATIENT)
Dept: ULTRASOUND IMAGING | Facility: CLINIC | Age: 45
End: 2025-05-24
Payer: COMMERCIAL

## 2025-05-24 DIAGNOSIS — K76.0 FATTY (CHANGE OF) LIVER, NOT ELSEWHERE CLASSIFIED: ICD-10-CM

## 2025-05-24 DIAGNOSIS — K74.02 HEPATIC FIBROSIS, ADVANCED FIBROSIS: ICD-10-CM

## 2025-05-24 DIAGNOSIS — K75.4 AUTOIMMUNE HEPATITIS: ICD-10-CM

## 2025-05-24 PROCEDURE — 76700 US EXAM ABDOM COMPLETE: CPT

## 2025-05-24 PROCEDURE — 76700 US EXAM ABDOM COMPLETE: CPT | Mod: 26

## 2025-05-28 DIAGNOSIS — K75.4 AUTOIMMUNE HEPATITIS: ICD-10-CM

## 2025-06-13 ENCOUNTER — NON-APPOINTMENT (OUTPATIENT)
Age: 45
End: 2025-06-13

## 2025-06-23 ENCOUNTER — NON-APPOINTMENT (OUTPATIENT)
Age: 45
End: 2025-06-23

## 2025-07-03 ENCOUNTER — APPOINTMENT (OUTPATIENT)
Dept: GASTROENTEROLOGY | Facility: CLINIC | Age: 45
End: 2025-07-03
Payer: COMMERCIAL

## 2025-07-03 PROCEDURE — 76981 USE PARENCHYMA: CPT

## 2025-07-23 ENCOUNTER — NON-APPOINTMENT (OUTPATIENT)
Age: 45
End: 2025-07-23

## 2025-07-24 ENCOUNTER — APPOINTMENT (OUTPATIENT)
Dept: OPHTHALMOLOGY | Facility: CLINIC | Age: 45
End: 2025-07-24

## 2025-08-11 ENCOUNTER — APPOINTMENT (OUTPATIENT)
Dept: HEPATOLOGY | Facility: CLINIC | Age: 45
End: 2025-08-11
Payer: COMMERCIAL

## 2025-08-11 VITALS
HEIGHT: 74 IN | BODY MASS INDEX: 29.9 KG/M2 | OXYGEN SATURATION: 98 % | DIASTOLIC BLOOD PRESSURE: 73 MMHG | WEIGHT: 233 LBS | RESPIRATION RATE: 14 BRPM | HEART RATE: 61 BPM | SYSTOLIC BLOOD PRESSURE: 107 MMHG

## 2025-08-11 DIAGNOSIS — K76.0 FATTY (CHANGE OF) LIVER, NOT ELSEWHERE CLASSIFIED: ICD-10-CM

## 2025-08-11 DIAGNOSIS — D84.821 IMMUNODEFICIENCY DUE TO DRUGS: ICD-10-CM

## 2025-08-11 DIAGNOSIS — K75.4 AUTOIMMUNE HEPATITIS: ICD-10-CM

## 2025-08-11 DIAGNOSIS — K74.02 HEPATIC FIBROSIS, ADVANCED FIBROSIS: ICD-10-CM

## 2025-08-11 PROCEDURE — 99205 OFFICE O/P NEW HI 60 MIN: CPT

## 2025-08-11 RX ORDER — CHASTE TREE FRUIT 400 MG
CAPSULE ORAL
Refills: 0 | Status: ACTIVE | COMMUNITY

## 2025-09-04 ENCOUNTER — NON-APPOINTMENT (OUTPATIENT)
Age: 45
End: 2025-09-04

## 2025-09-05 ENCOUNTER — NON-APPOINTMENT (OUTPATIENT)
Age: 45
End: 2025-09-05

## 2025-09-05 ENCOUNTER — APPOINTMENT (OUTPATIENT)
Dept: OPHTHALMOLOGY | Facility: CLINIC | Age: 45
End: 2025-09-05
Payer: COMMERCIAL

## 2025-09-05 PROCEDURE — 92004 COMPRE OPH EXAM NEW PT 1/>: CPT

## 2025-09-05 PROCEDURE — 92015 DETERMINE REFRACTIVE STATE: CPT

## 2025-09-17 ENCOUNTER — APPOINTMENT (OUTPATIENT)
Dept: HEMATOLOGY ONCOLOGY | Facility: CLINIC | Age: 45
End: 2025-09-17